# Patient Record
Sex: FEMALE | Race: WHITE | NOT HISPANIC OR LATINO | Employment: FULL TIME | URBAN - METROPOLITAN AREA
[De-identification: names, ages, dates, MRNs, and addresses within clinical notes are randomized per-mention and may not be internally consistent; named-entity substitution may affect disease eponyms.]

---

## 2017-04-05 DIAGNOSIS — F40.298 ISOLATED OR SPECIFIC PHOBIA: ICD-10-CM

## 2017-04-05 DIAGNOSIS — G47.00 INSOMNIA, UNSPECIFIED TYPE: ICD-10-CM

## 2017-04-05 NOTE — TELEPHONE ENCOUNTER
Controlled Substance Refill Request for zolpidem (AMBIEN) 5 MG tablet  Problem List Complete:  No     PROVIDER TO CONSIDER COMPLETION OF PROBLEM LIST AND OVERVIEW/CONTROLLED SUBSTANCE AGREEMENT    Medication Detail      Disp Refills Start End HEENA   zolpidem (AMBIEN) 5 MG tablet 30 tablet 5 8/22/2016  No   Sig: Take 1 tablet (5 mg) by mouth nightly as needed for sleep         Last Office Visit with [facility] primary care provider: 8/22/16    Future Office visit:     Controlled substance agreement on file: No.     Processing:     checked in past 6 months?

## 2017-04-05 NOTE — TELEPHONE ENCOUNTER
Controlled Substance Refill Request for LORazepam (ATIVAN) 0.5 MG tablet  Problem List Complete:  No     PROVIDER TO CONSIDER COMPLETION OF PROBLEM LIST AND OVERVIEW/CONTROLLED SUBSTANCE AGREEMENT    Medication Detail      Disp Refills Start End HEENA   LORazepam (ATIVAN) 0.5 MG tablet 15 tablet 0 8/22/2016  No   Sig: Take 0.5-1 tablets (0.25-0.5 mg) by mouth every 8 hours as needed for anxiety Take 30 minutes prior to departure.  Do not operate a vehicle after taking this medication         [place] [facility] [place]: 8/22/16    Future Office visit:     Controlled substance agreement on file: No.     Processing:     checked in past 6 months?

## 2017-04-06 RX ORDER — ZOLPIDEM TARTRATE 5 MG/1
5 TABLET ORAL
Qty: 30 TABLET | Refills: 5 | Status: SHIPPED | OUTPATIENT
Start: 2017-04-06 | End: 2018-07-24

## 2017-04-06 RX ORDER — LORAZEPAM 0.5 MG/1
0.25-0.5 TABLET ORAL EVERY 8 HOURS PRN
Qty: 15 TABLET | Refills: 0 | Status: SHIPPED | OUTPATIENT
Start: 2017-04-06 | End: 2018-08-16

## 2017-04-06 NOTE — TELEPHONE ENCOUNTER
MA -- I printed and signed the refill. If the patient fills this medication at our pharmacy, please walk the prescription to our pharmacy. If the patient does not fill this prescription at our pharmacy, please fax to the designated pharmacy. Let the patient know when this has been done. Thanks! Christal Keith M.D.

## 2017-06-23 ENCOUNTER — OFFICE VISIT (OUTPATIENT)
Dept: FAMILY MEDICINE | Facility: CLINIC | Age: 52
End: 2017-06-23
Payer: COMMERCIAL

## 2017-06-23 ENCOUNTER — RADIANT APPOINTMENT (OUTPATIENT)
Dept: GENERAL RADIOLOGY | Facility: CLINIC | Age: 52
End: 2017-06-23
Attending: FAMILY MEDICINE
Payer: COMMERCIAL

## 2017-06-23 VITALS
SYSTOLIC BLOOD PRESSURE: 102 MMHG | OXYGEN SATURATION: 99 % | TEMPERATURE: 98.2 F | DIASTOLIC BLOOD PRESSURE: 72 MMHG | WEIGHT: 146 LBS | HEART RATE: 61 BPM | RESPIRATION RATE: 16 BRPM | BODY MASS INDEX: 24.3 KG/M2

## 2017-06-23 DIAGNOSIS — M79.641 PAIN OF RIGHT HAND: ICD-10-CM

## 2017-06-23 DIAGNOSIS — N95.1 MENOPAUSAL SYNDROME (HOT FLASHES): Primary | ICD-10-CM

## 2017-06-23 PROCEDURE — 99214 OFFICE O/P EST MOD 30 MIN: CPT | Performed by: FAMILY MEDICINE

## 2017-06-23 PROCEDURE — 73130 X-RAY EXAM OF HAND: CPT | Mod: RT

## 2017-06-23 RX ORDER — PAROXETINE 7.5 MG/1
1 CAPSULE ORAL AT BEDTIME
Qty: 30 CAPSULE | Refills: 1 | Status: SHIPPED | OUTPATIENT
Start: 2017-06-23 | End: 2018-08-16

## 2017-06-23 NOTE — MR AVS SNAPSHOT
After Visit Summary   6/23/2017    Bridget Coyne    MRN: [id number]           Patient Information     Date Of Birth          1965        Visit Information        Provider Department      6/23/2017 9:20 AM Serenity Duran MD [facility]        Today's Diagnoses     Menopausal syndrome (hot flashes)    -  1    Pain of right hand          Care Instructions    Hot flashes   - PARoxetine Mesylate 7.5 MG CAPS; Take 1 tablet by mouth At Bedtime  - follow up in one month if symptoms are not improving     Hand pain  Course of physical therapy and ibuprofen as needed  If pain not improving, then please call clinic and I'll place a referral for orthopedic clinic.               Follow-ups after your visit        Additional Services     [facility] PT, HAND, AND CHIROPRACTIC REFERRAL       **This order will print in the [facility] Scheduling Office**    Physical Therapy, Hand Therapy and Chiropractic Care are available through:    *[facility]  *[facility]  *[facility]    Call one number to schedule at any of the above locations: [phone].    Your provider has referred you to: Hand Therapy    Indication/Reason for Referral: Hand Pain  Onset of Illness: couple of years, worse over last six months  Therapy Orders: Evaluate and Treat  Special Programs: None  Special Request: None    Darnell Patrick      Additional Comments for the Therapist or Chiropractor: none    Please be aware that coverage of these services is subject to the terms and limitations of your health insurance plan.  Call member services at your health plan with any benefit or coverage questions.      Please bring the following to your appointment:    *Your personal calendar for scheduling future appointments  *Comfortable clothing                  Your next 10 appointments already scheduled     Aug 17, 2017  8:00 AM CDT   PHYSICAL with Christal Keith MD   [facility]  [place] ([facility])    [address] [place] [address] [phone]              Future tests that were ordered for you today     Open Future Orders        Priority Expected Expires Ordered    XR Hand Right G/E 3 Views Routine 6/23/2017 6/23/2018 6/23/2017            Who to contact     If you have questions or need follow up information about today's clinic visit or your schedule please contact [facility] directly at [phone].  Normal or non-critical lab and imaging results will be communicated to you by CicerOOshart, letter or phone within 4 business days after the clinic has received the results. If you do not hear from us within 7 days, please contact the clinic through iWantoo or phone. If you have a critical or abnormal lab result, we will notify you by phone as soon as possible.  Submit refill requests through iWantoo or call your pharmacy and they will forward the refill request to us. Please allow 3 business days for your refill to be completed.          Additional Information About Your Visit        iWantoo Information     iWantoo gives you secure access to your electronic health record. If you see a primary care provider, you can also send messages to your care team and make appointments. If you have questions, please call your primary care clinic.  If you do not have a primary care provider, please call [phone] and they will assist you.        Care EveryWhere ID     This is your Care EveryWhere ID. This could be used by other organizations to access your [facility] medical records  [id number]        Your Vitals Were     Pulse Temperature Respirations Pulse Oximetry BMI (Body Mass Index)       61 98.2  F (36.8  C) (Tympanic) 16 99% 24.3 kg/m2        Blood Pressure from Last 3 Encounters:   06/23/17 102/72   08/22/16 99/66   06/14/16 103/73    Weight from Last 3 Encounters:   06/23/17 146 lb (66.2 kg)   08/22/16 147 lb 12 oz (67 kg)   06/14/16 144 lb  (65.3 kg)              We Performed the Following     [facility] PT, HAND, AND CHIROPRACTIC REFERRAL          Today's Medication Changes          These changes are accurate as of: 6/23/17  9:57 AM.  If you have any questions, ask your nurse or doctor.               Start taking these medicines.        Dose/Directions    PARoxetine Mesylate 7.5 MG Caps   Used for:  Menopausal syndrome (hot flashes)   Started by:  Serenity Duran MD        Dose:  1 tablet   Take 1 tablet by mouth At Bedtime   Quantity:  30 capsule   Refills:  1            Where to get your medicines      These medications were sent to [facility] [id number] [place] [address] AT [facility] & [address] [place] [id number]    Hours:  24-hours Phone:  [phone]     PARoxetine Mesylate 7.5 MG Caps                Primary Care Provider Office Phone # Fax #    Christal Keith [phone]       [facility] [address] [facility] [address]        Equal Access to Services     [facility]: Hadii lu ku hadasho Sokishore, waaxda luqadaha, qaybta kaalmada adeegyaveronica, ravin wade . So [facility] [phone].    ATENCIÓN: Si habla español, tiene a terry disposición servicios gratuitos de asistencia lingüística. Llame al [phone].    We comply with applicable federal civil rights laws and [place] laws. We do not discriminate on the basis of race, color, national origin, age, disability sex, sexual orientation or gender identity.            Thank you!     Thank you for choosing [facility]  for your care. Our goal is always to provide you with excellent care. Hearing back from our patients is one way we can continue to improve our services. Please take a few minutes to complete the written survey that you may receive in the mail after your visit with us. Thank you!             Your Updated Medication List - Protect others around you: Learn  how to safely use, store and throw away your medicines at www.disposemymeds.org.          This list is accurate as of: 6/23/17  9:57 AM.  Always use your most recent med list.                   Brand Name Dispense Instructions for use Diagnosis    acyclovir 5 % cream    ZOVIRAX    1 g    To affected areas    Herpes simplex without mention of complication       ADVIL 200 MG capsule   Generic drug:  ibuprofen     100    1 CAPSULE EVERY 4 TO 6 HOURS AS NEEDED    Back pain       LORazepam 0.5 MG tablet    ATIVAN    15 tablet    Take 0.5-1 tablets (0.25-0.5 mg) by mouth every 8 hours as needed for anxiety Take 30 minutes prior to departure.  Do not operate a vehicle after taking this medication    Isolated or specific phobia       PARoxetine Mesylate 7.5 MG Caps     30 capsule    Take 1 tablet by mouth At Bedtime    Menopausal syndrome (hot flashes)       valACYclovir 1000 mg tablet    VALTREX    4 tablet    Take 2 tablets (2,000 mg) by mouth 2 times daily    Encounter for routine adult health examination without abnormal findings       zolpidem 5 MG tablet    AMBIEN    30 tablet    Take 1 tablet (5 mg) by mouth nightly as needed for sleep    Insomnia, unspecified type

## 2017-06-23 NOTE — PATIENT INSTRUCTIONS
Hot flashes   - PARoxetine Mesylate 7.5 MG CAPS; Take 1 tablet by mouth At Bedtime  - follow up in one month if symptoms are not improving     Hand pain  Course of physical therapy and ibuprofen as needed  If pain not improving, then please call clinic and I'll place a referral for orthopedic clinic.

## 2017-06-23 NOTE — NURSING NOTE
"Chief Complaint   Patient presents with     Musculoskeletal Problem     hand pain       Initial /72 (BP Location: Right arm, Patient Position: Chair, Cuff Size: Adult Regular)  Pulse 61  Temp 98.2  F (36.8  C) (Tympanic)  Resp 16  Wt 146 lb (66.2 kg)  SpO2 99%  BMI 24.3 kg/m2 Estimated body mass index is 24.3 kg/(m^2) as calculated from the following:    Height as of 8/22/16: 5' 5\" (1.651 m).    Weight as of this encounter: 146 lb (66.2 kg).  Medication Reconciliation: complete     Dayne Greer MA        "

## 2017-06-23 NOTE — PROGRESS NOTES
SUBJECTIVE:                                                    rBidget Coyne is a 51 year old female who presents to clinic today for the following health issues:      Musculoskeletal problem/pain      Duration: couple of years, consistent over the last six months     Description  Location: right hand, constant      Intensity:  Mild, aching     Accompanying signs and symptoms: none    History  Previous similar problem: YES  Previous evaluation:  none    Precipitating or alleviating factors:  Trauma or overuse: YES,    Aggravating factors include: overuse    Therapies tried and outcome: ibuprofen which helps    Pt is an .   No numbness/tingling or swelling.  No recent injujry.       Pt is having extreme hot flashes. A/s with nausea and headaches. It is making it uncomfortable at work. She is currently doing deep breathing, black colhash ans soya. She is wondering if she can take an antidepressant for a short term. LMP was around 07-08/2017. No fatigue, hair loss or vaginal dryness. No history of MDD or LATOYA.     Problem list and histories reviewed & adjusted, as indicated.  Additional history: as documented        Reviewed and updated as needed this visit by clinical staff       Reviewed and updated as needed this visit by Provider         ROS:  Constitutional, HEENT, cardiovascular, pulmonary, gi and gu systems are negative, except as otherwise noted.    OBJECTIVE:                                                    /72 (BP Location: Right arm, Patient Position: Chair, Cuff Size: Adult Regular)  Pulse 61  Temp 98.2  F (36.8  C) (Tympanic)  Resp 16  Wt 146 lb (66.2 kg)  SpO2 99%  BMI 24.3 kg/m2  Body mass index is 24.3 kg/(m^2).  GENERAL: healthy, alert and no distress  EYES: Eyes grossly normal to inspection  HENT: nose and mouth without ulcers or lesions  MS: right hand - no gross musculoskeletal defects noted, no edema, + metacarpal tenderness, normal ROM  SKIN: no suspicious  lesions or rashes    Diagnostic Test Results:  none      ASSESSMENT/PLAN:                                                        1. Menopausal syndrome (hot flashes)  - discussed tx with antidepressants vs gabapentin, pt opted for antidepressants, discussed side effects   - PARoxetine Mesylate 7.5 MG CAPS; Take 1 tablet by mouth At Bedtime  Dispense: 30 capsule; Refill: 1  - follow up in one month if symptoms are not improving     2. Pain of right hand  - [facility] PT, HAND, AND CHIROPRACTIC REFERRAL  - XR Hand Right G/E 3 Views; Future  Course of physical therapy and ibuprofen as needed  If pain not improving, then please call clinic and I'll place a referral for orthopedic clinic.     Serenity Duran MD  [facility]

## 2017-07-11 ENCOUNTER — THERAPY VISIT (OUTPATIENT)
Dept: OCCUPATIONAL THERAPY | Facility: CLINIC | Age: 52
End: 2017-07-11
Payer: COMMERCIAL

## 2017-07-11 DIAGNOSIS — M79.641 PAIN OF RIGHT HAND: Primary | ICD-10-CM

## 2017-07-11 PROCEDURE — 97018 PARAFFIN BATH THERAPY: CPT | Mod: GO | Performed by: OCCUPATIONAL THERAPIST

## 2017-07-11 PROCEDURE — 97110 THERAPEUTIC EXERCISES: CPT | Mod: GO | Performed by: OCCUPATIONAL THERAPIST

## 2017-07-11 PROCEDURE — 97165 OT EVAL LOW COMPLEX 30 MIN: CPT | Mod: GO | Performed by: OCCUPATIONAL THERAPIST

## 2017-07-11 PROCEDURE — 97535 SELF CARE MNGMENT TRAINING: CPT | Mod: GO | Performed by: OCCUPATIONAL THERAPIST

## 2017-07-11 NOTE — MR AVS SNAPSHOT
"              After Visit Summary   7/11/2017    Bridget Coyne    MRN: [id number]           Patient Information     Date Of Birth          1965        Visit Information        Provider Department      7/11/2017 4:00 PM Diana Worley OT [facility]        Today's Diagnoses     Pain of right hand    -  1       Follow-ups after your visit        Your next 10 appointments already scheduled     Jul 25, 2017  9:00 AM CDT   [facility] Hand with KAY [facility] ([facility])    [address] [facility]  [address] [facility] [address] [phone]            Aug 17, 2017  8:00 AM CDT   PHYSICAL with Christal Keith MD   [facility] ([facility])    [address] [place] [address] [phone]              Who to contact     If you have questions or need follow up information about today's clinic visit or your schedule please contact [facility] directly at [phone].  Normal or non-critical lab and imaging results will be communicated to you by ClearServehart, letter or phone within 4 business days after the clinic has received the results. If you do not hear from us within 7 days, please contact the clinic through ClearServehart or phone. If you have a critical or abnormal lab result, we will notify you by phone as soon as possible.  Submit refill requests through C-sam or call your pharmacy and they will forward the refill request to us. Please allow 3 business days for your refill to be completed.          Additional Information About Your Visit        MyChart Information     C-sam lets you send messages to your doctor, view your test results, renew your prescriptions, schedule appointments and more. To sign up, go to [email or web address] . Click on \"Log in\" on the left side of the screen, which will take you to the Welcome page. Then click on \"Sign up Now\" on the right side of the page.     You " will be asked to enter the access code listed below, as well as some personal information. Please follow the directions to create your username and password.     Your access code is: [email or web address]  Expires: 10/9/2017  5:21 PM     Your access code will  in 90 days. If you need help or a new code, please call your [facility] clinic or [phone].        Care EveryWhere ID     This is your Care EveryWhere ID. This could be used by other organizations to access your [facility] medical records  [id number]         Blood Pressure from Last 3 Encounters:   17 102/72   16 99/66   16 103/73    Weight from Last 3 Encounters:   17 66.2 kg (146 lb)   16 67 kg (147 lb 12 oz)   16 65.3 kg (144 lb)              We Performed the Following     HC OT EVAL, LOW COMPLEXITY     PARAFFIN BATH THERAPY     SELF CARE MNGMENT TRAINING     THERAPEUTIC EXERCISES        Primary Care Provider Office Phone # Fax #    Christal Keith [phone]       [facility] [address]  [facility] [address]        Equal Access to Services     [facility]: Hadii aad ku hadasho Sokishore, waaxda luqadaha, qaybta kaalmada adeegyada, ravin brockn anh spangler. So [facility] [phone].    ATENCIÓN: Si habla español, tiene a terry disposición servicios gratuitos de asistencia lingüística. [facility] [phone].    We comply with applicable federal civil rights laws and [place] laws. We do not discriminate on the basis of race, color, national origin, age, disability sex, sexual orientation or gender identity.            Thank you!     Thank you for choosing [facility]ER[facility]  for your care. Our goal is always to provide you with excellent care. Hearing back from our patients is one way we can continue to improve our services. Please take a few minutes to complete the written survey that you may receive in the mail after your visit with us. Thank you!             Your Updated  Medication List - Protect others around you: Learn how to safely use, store and throw away your medicines at www.disposemymeds.org.          This list is accurate as of: 7/11/17  5:21 PM.  Always use your most recent med list.                   Brand Name Dispense Instructions for use Diagnosis    acyclovir 5 % cream    ZOVIRAX    1 g    To affected areas    Herpes simplex without mention of complication       ADVIL 200 MG capsule   Generic drug:  ibuprofen     100    1 CAPSULE EVERY 4 TO 6 HOURS AS NEEDED    Back pain       LORazepam 0.5 MG tablet    ATIVAN    15 tablet    Take 0.5-1 tablets (0.25-0.5 mg) by mouth every 8 hours as needed for anxiety Take 30 minutes prior to departure.  Do not operate a vehicle after taking this medication    Isolated or specific phobia       PARoxetine Mesylate 7.5 MG Caps     30 capsule    Take 1 tablet by mouth At Bedtime    Menopausal syndrome (hot flashes)       valACYclovir 1000 mg tablet    VALTREX    4 tablet    Take 2 tablets (2,000 mg) by mouth 2 times daily    Encounter for routine adult health examination without abnormal findings       zolpidem 5 MG tablet    AMBIEN    30 tablet    Take 1 tablet (5 mg) by mouth nightly as needed for sleep    Insomnia, unspecified type

## 2017-07-11 NOTE — PROGRESS NOTES
Hand Therapy Initial Evaluation    Current Date:  7/11/2017    Diagnosis:  Right  hand pain  DOI:  About 2-3 years ago  Referring MD: Serenity Duran MD  CC: Christal Keith MD        Initial Subjective:  Bridget Coyne is a 51 year old  right  hand dominant female.    Patient reports symptoms of pain, stiffness/loss of motion, weakness/loss of strength and edema of the right  hand  which occurred due to unknown cause. Since onset symptoms are Gradually getting worse.  Special tests:  none.  Previous treatment: none.    General health as reported by patient is excellent.  Pertinent medical history includes:menopausal  Medical allergies:none.  Surgical history: none.  Medication history: none.    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 NO specific time of injury. The more she does it is more sore. Even raking, working in the yard, then the hand was sore.   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   Job Tasks: repetitive tasks  Barriers include:none  Prior functional level:  no limitations    Additional Occupational Profile Information (patterns of daily living, interests, values and needs):Pt reports difficulty with . household chores, work , sports/recreation and bowling is difficult, biking is ok.   Opening jars, bottle, etc.   Living situation: lives with their spouse and with children, 18 and 21  Mobility: No difficulty getting around home and community  Transportation: Able to drive own car for transportation  Leisure activities / hobbies: bowling and biking  Other: likes to work out with light weights    Functional Outcome Measure:  See flowsheet        Objective:  Observation: Color/Temperature:     [x]    Normal  []    Abnormal    PAIN 7/11/2017     Location Right  Hand, MCPs and PIPS in all fingers     Description   Aching, Dull, Sharp and Tender     Radiates no     Worse d/n daytime     Frequency activity dependant     Exacerbated by See above list     Relieves heat, NSAIDs and rest     At rest 0-10/10 0/10     On use 0-10/10 3/10     At worst.0-10/10 8/10     Sleep disruption?   no     Progression Gradually getting worse.         ROM:  1st TABLE TOP  AROM(PROM) 2017    Right Left   Index MP /80  /85 /88  /85   PIP /104 /100   DIP /65 /65   Long MP /70  /85 /87  /85   PIP /105 /105   DIP /63 /65   Ring MP /65  /73   /80  /85   PIP /110 /105   DIP /47 /55   Small MP /60  /82 /80  /85   PIP /92 /93   DIP /75 /65     Tender to palpation:   R MF MCP      STRENGTH:   (Measured in pounds)     2017      Trials Right Left Right Left   1  2  3  Av  25  36 60  65  60  62         3 pt Pinch 2017      Trials Right Left Right Left   1  2  3  Av  10  10  10 12  12  12  12       Lateral Pinch 2017      Trials Right Left Right Left   1  2  3  Av  11  13  12 14  12  13  13         Edema:  mild of the  long finger at the MCP joint    Palpation:  []     Normal        [x]   Tender       []  Mild         [x]   Moderate []   Severe   Location: Long Finger MCP joint, dorsal and volar      Sensation:  WNL throughout all nerve distributions; per patient report      Assessment:   Patient presents with symptoms consistent with above diagnosis,  with conservative intervention.     Patient's limitations or Problem List includes:  Pain, Decreased ROM/motion, Weakness and Decreased  of the right hand and long finger which interferes with the patient's ability to perform Self Care Tasks (dressing), Work Tasks, Recreational Activities, Household Chores and Driving  as compared to previous level of function.    Rehab Potential:  Excellent - Return to full activity, no limitations    Patient will benefit from skilled Occupational Therapy to increase ROM, flexibility and  strength and decrease pain and  edema to return to previous activity level and resume normal daily tasks and to reach their rehab potential.    Barriers to Learning:  No barrier    Communication Issues:  Patient appears to be able to clearly communicate and understand verbal and written communication and follow directions correctly.  Clinical Decision Making (Complexity): Low complexity  Chart Review, Brief history including review of medical and/or therapy records relating to the presenting problem and Detailed history review with patient    Assessment of Occupational Performance:  3-5 Performance Deficits  Identified Performance Deficits: dressing, home establishment and management, meal preparation and cleanup, shopping, work, play and leisure activities      Treatment Explanation:  The following has been discussed with the patient:  RX ordered/plan of care  Anticipated outcomes  Possible risks and side effects    Treatment Plan:   Frequency:  2 X a month, once daily  Duration:  for 2 months     Modalities:  Paraffin  Therapeutic Exercise:  Tendon Gliding and Isometrics  Self Care:  Ergonomic Considerations    Discharge Plan:  Achieve all LTG.  Independent in home treatment program.  Reach maximal therapeutic benefit.    Home Exercise Program:  Tendon gliding  Table top fist  Home paraffin bath  Pursue adaptive equipment for joint protection    Next Visit:  Progress to isometrics

## 2017-07-25 ENCOUNTER — THERAPY VISIT (OUTPATIENT)
Dept: OCCUPATIONAL THERAPY | Facility: CLINIC | Age: 52
End: 2017-07-25
Payer: COMMERCIAL

## 2017-07-25 DIAGNOSIS — M79.641 PAIN OF RIGHT HAND: ICD-10-CM

## 2017-07-25 PROCEDURE — 97018 PARAFFIN BATH THERAPY: CPT | Mod: GO | Performed by: OCCUPATIONAL THERAPIST

## 2017-07-25 PROCEDURE — 97140 MANUAL THERAPY 1/> REGIONS: CPT | Mod: GO | Performed by: OCCUPATIONAL THERAPIST

## 2017-07-25 PROCEDURE — 97530 THERAPEUTIC ACTIVITIES: CPT | Mod: GO | Performed by: OCCUPATIONAL THERAPIST

## 2017-07-25 NOTE — MR AVS SNAPSHOT
"              After Visit Summary   7/25/2017    Bridget Coyne    MRN: [id number]           Patient Information     Date Of Birth          1965        Visit Information        Provider Department      7/25/2017 9:00 AM Diana Worley OT [facility]        Today's Diagnoses     Pain of right hand           Follow-ups after your visit        Your next 10 appointments already scheduled     Aug 14, 2017 12:30 PM CDT   JOÃO Hand with KAY [facility] ([facility])    [address] [facility] [address] [phone]            Aug 17, 2017  8:00 AM CDT   PHYSICAL with Christal Keith MD   [facility] ([facility])    [address] [place] [address] [phone]              Who to contact     If you have questions or need follow up information about today's clinic visit or your schedule please contact [facility] directly at [phone].  Normal or non-critical lab and imaging results will be communicated to you by [employer]Chart, letter or phone within 4 business days after the clinic has received the results. If you do not hear from us within 7 days, please contact the clinic through Bant[employer]Chart or phone. If you have a critical or abnormal lab result, we will notify you by phone as soon as possible.  Submit refill requests through [employer] or call your pharmacy and they will forward the refill request to us. Please allow 3 business days for your refill to be completed.          Additional Information About Your Visit        [employer]Char1Rebel Information     [employer] lets you send messages to your doctor, view your test results, renew your prescriptions, schedule appointments and more. To sign up, go to [email or web address] . Click on \"Log in\" on the left side of the screen, which will take you to the Welcome page. Then click on \"Sign up Now\" on the right side of the page.     You will " be asked to enter the access code listed below, as well as some personal information. Please follow the directions to create your username and password.     Your access code is: [email or web address]  Expires: 10/9/2017  5:21 PM     Your access code will  in 90 days. If you need help or a new code, please call your [place] clinic or [phone].        Care EveryWhere ID     This is your Care EveryWhere ID. This could be used by other organizations to access your [place] medical records  [id number]         Blood Pressure from Last 3 Encounters:   17 102/72   16 99/66   16 103/73    Weight from Last 3 Encounters:   17 66.2 kg (146 lb)   16 67 kg (147 lb 12 oz)   16 65.3 kg (144 lb)              We Performed the Following     MANUAL THER TECH,1+REGIONS,EA 15 MIN     PARAFFIN BATH THERAPY     THERAPEUTIC ACTIVITIES        Primary Care Provider Office Phone # Fax #    Christal Keith [phone]       [facility] [address]  [facility] [address]        Equal Access to Services     [place] : Hadii aad ku hadasho Soomaali, waaxda luqadaha, qaybta kaalmada adeegyada, waxay luisin hayaan adecharles palafox lamilagros ah. So [facility] [phone].    ATENCIÓN: Si habla español, tiene a terry disposición servicios gratuitos de asistencia lingüística. Llame al [phone].    We comply with applicable federal civil rights laws and [place] laws. We do not discriminate on the basis of race, color, national origin, age, disability sex, sexual orientation or gender identity.            Thank you!     Thank you for choosing [facility]  for your care. Our goal is always to provide you with excellent care. Hearing back from our patients is one way we can continue to improve our services. Please take a few minutes to complete the written survey that you may receive in the mail after your visit with us. Thank you!             Your Updated Medication List - Protect  others around you: Learn how to safely use, store and throw away your medicines at www.disposemymeds.org.          This list is accurate as of: 7/25/17  9:55 AM.  Always use your most recent med list.                   Brand Name Dispense Instructions for use Diagnosis    acyclovir 5 % cream    ZOVIRAX    1 g    To affected areas    Herpes simplex without mention of complication       ADVIL 200 MG capsule   Generic drug:  ibuprofen     100    1 CAPSULE EVERY 4 TO 6 HOURS AS NEEDED    Back pain       LORazepam 0.5 MG tablet    ATIVAN    15 tablet    Take 0.5-1 tablets (0.25-0.5 mg) by mouth every 8 hours as needed for anxiety Take 30 minutes prior to departure.  Do not operate a vehicle after taking this medication    Isolated or specific phobia       PARoxetine Mesylate 7.5 MG Caps     30 capsule    Take 1 tablet by mouth At Bedtime    Menopausal syndrome (hot flashes)       valACYclovir 1000 mg tablet    VALTREX    4 tablet    Take 2 tablets (2,000 mg) by mouth 2 times daily    Encounter for routine adult health examination without abnormal findings       zolpidem 5 MG tablet    AMBIEN    30 tablet    Take 1 tablet (5 mg) by mouth nightly as needed for sleep    Insomnia, unspecified type

## 2017-07-25 NOTE — PROGRESS NOTES
SOAP note objective information for 7/25/2017.  Please refer to the daily flowsheet for treatment today, total treatment time and time spent performing 1:1 timed codes.        Diagnosis:  Right  hand pain  DOI:  About 2-3 years ago  Referring MD: Serenity Duran MD  CC: Christal Keith MD    Initial Subjective:  Bridget Coyne is a 51 year old  right  hand dominant female.    Patient reports symptoms of pain, stiffness/loss of motion, weakness/loss of strength and edema of the right  hand  which occurred due to unknown cause. Since onset symptoms are Gradually getting worse.  Special tests:  none.  Previous treatment: none.    General health as reported by patient is excellent.  Pertinent medical history includes:menopausal  Medical allergies:none.  Surgical history: none.  Medication history: none.    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 NO specific time of injury. The more she does it is more sore. Even raking, working in the yard, then the hand was sore.   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   Job Tasks: repetitive tasks  Barriers include:none  Prior functional level:  no limitations    Additional Occupational Profile Information (patterns of daily living, interests, values and needs):Pt reports difficulty with . household chores, work , sports/recreation and bowling is difficult, biking is ok.   Opening jars, bottle, etc.   Living situation: lives with their spouse and with children, 18 and 21  Mobility: No difficulty getting around home and community  Transportation: Able to drive own car for transportation  Leisure activities / hobbies: bowling and biking  Other: likes to work out with light weights    Functional Outcome Measure:  See flowsheet    Exercises: strike class: Strike bar, and then move the bar side  to side, punching.     Objective:  Observation: Color/Temperature:     [x]    Normal  []    Abnormal    PAIN 2017     Location Right  Hand, MCPs and PIPS in all fingers     Description  Aching, Dull, Sharp and Tender     Radiates no     Worse d/n daytime     Frequency activity dependant     Exacerbated by See above list     Relieves heat, NSAIDs and rest     At rest 0-10/10 0/10     On use 0-10/10 3/10     At worst.0-10/10 8/10     Sleep disruption?   no     Progression Gradually getting worse.         ROM:  1st TABLE TOP  AROM(PROM) 2017    Right Left Right before heat   Index MP /80  /85 (after heat) /88  /85    PIP /104 /100    DIP /65 /65    Long MP /70  /85 (after heat) /87  /85 /80   PIP /105 /105 /107   DIP /63 /65 /75   Ring MP /65  /73 (after heat)   /80  /85    PIP /110 /105    DIP /47 /55    Small MP /60  /82 (after heat) /80  /85    PIP /92 /93    DIP /75 /65      Tender to palpation:   R MF MCP      STRENGTH:   (Measured in pounds)     2017    Trials Right Left Right Left   1  2  3  Av  25  36 60  65  60  62 35  3rd run        3 pt Pinch 2017      Trials Right Left Right Left   1  2  3  Av  10  10  10 12  12  12  12       Lateral Pinch 2017      Trials Right Left Right Left   1  2  3  Av  11  13  12 14  12  13  13         Edema:  mild of the  long finger at the MCP joint    Palpation:  []     Normal        [x]   Tender       []  Mild         [x]   Moderate []   Severe   Location: Long Finger MCP joint, dorsal and volar      Sensation:  WNL throughout all nerve distributions; per patient report      Assessment:   Patient presents with symptoms consistent with above diagnosis,  with conservative intervention.     Patient's limitations or Problem List includes:  Pain, Decreased ROM/motion, Weakness and Decreased  of the right hand and long finger which interferes with the patient's ability to perform Self Care Tasks (dressing), Work  Tasks, Recreational Activities, Household Chores and Driving  as compared to previous level of function.    Rehab Potential:  Excellent - Return to full activity, no limitations    Patient will benefit from skilled Occupational Therapy to increase ROM, flexibility and  strength and decrease pain and edema to return to previous activity level and resume normal daily tasks and to reach their rehab potential.    Barriers to Learning:  No barrier    Communication Issues:  Patient appears to be able to clearly communicate and understand verbal and written communication and follow directions correctly.  Clinical Decision Making (Complexity): Low complexity  Chart Review, Brief history including review of medical and/or therapy records relating to the presenting problem and Detailed history review with patient    Assessment of Occupational Performance:  3-5 Performance Deficits  Identified Performance Deficits: dressing, home establishment and management, meal preparation and cleanup, shopping, work, play and leisure activities      Treatment Explanation:  The following has been discussed with the patient:  RX ordered/plan of care  Anticipated outcomes  Possible risks and side effects    Treatment Plan:   Frequency:  2 X a month, once daily  Duration:  for 2 months     Modalities:  Paraffin  Therapeutic Exercise:  Tendon Gliding and Isometrics  Self Care:  Ergonomic Considerations    Discharge Plan:  Achieve all LTG.  Independent in home treatment program.  Reach maximal therapeutic benefit.    Home Exercise Program:  Tendon gliding  Table top fist  Home paraffin bath  Pursue adaptive equipment for joint protection    Next Visit:  Progress to isometrics

## 2017-08-14 ENCOUNTER — THERAPY VISIT (OUTPATIENT)
Dept: OCCUPATIONAL THERAPY | Facility: CLINIC | Age: 52
End: 2017-08-14
Payer: COMMERCIAL

## 2017-08-14 DIAGNOSIS — M79.641 PAIN OF RIGHT HAND: ICD-10-CM

## 2017-08-14 PROCEDURE — 97110 THERAPEUTIC EXERCISES: CPT | Mod: GO | Performed by: OCCUPATIONAL THERAPIST

## 2017-08-14 PROCEDURE — 97535 SELF CARE MNGMENT TRAINING: CPT | Mod: GO | Performed by: OCCUPATIONAL THERAPIST

## 2017-08-14 NOTE — PROGRESS NOTES
Hand Therapy Progress/Discharge Note     8/14/2017  Initial Visit: 7/11/17  Total Visits: 3      Diagnosis:  Right  hand pain  DOI:  About 2-3 years ago  Referring MD: Serenity Duran MD  CC: Christal Keith MD      Initial Subjective:  Bridget Coyne is a 51 year old  right  hand dominant female.  Patient reports symptoms of pain, stiffness/loss of motion, weakness/loss of strength and edema of the right  hand  which occurred due to unknown cause.  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 NO specific time of injury. The more she does it is more sore. Even raking, working in the yard, then the hand was sore.   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   S:   Subjective: patient notes she feels like she has more knowledge to know what to do about her hand pain. She notes overall less pain   Functional changes noted by patient:   Improvement in Household Chores  Response to previous treatment: good  Patient has noted adverse reaction to:   None      Functional Outcome Measure:  See flowsheet    Objective:  Observation: Color/Temperature:     [x]    Normal  []    Abnormal    PAIN 7/11/2017 8/14/17    Location Right  Hand, MCPs and PIPS in all fingers     Description  Aching, Dull, Sharp and Tender     Radiates no     Worse d/n daytime     Frequency activity dependant     Exacerbated by See above list     Relieves heat, NSAIDs and rest     At rest 0-10/10 0/10 0/10    On use 0-10/10 3/10 3/10    At worst.0-10/10 8/10 6/10 less often    Sleep disruption?   no     Progression Gradually getting worse.         ROM:  1st TABLE TOP  AROM(PROM) 7/11/2017 7/11/2017 8/14/17    Right Left right   Index MP /80  /85 (after heat) /88  /85 /85   PIP /104 /100 /105   DIP /65 /65 /70   DURON:  249  260   Long MP /70  /85 (after heat) /87  /85 /90    PIP /105 /105 /110   DIP /63 /65 /75   DURON:  168  275   Ring MP /65  /73 (after heat)   /80  /85 /83   PIP /110 /105 /105   DIP /47 /55 /65   DURON:  122  253   Small MP /60  /82 (after heat) /80  /85 /90   PIP /92 /93 /105   DIP /75 /65 /80   DURON:  227  275     STRENGTH:   (Measured in pounds)     2017   Trials Right Left Right right   1  2  3  Av  25  36 60  65  60  62 35  3rd run 45  3rd run       3 pt Pinch 2017    Trials Right Left   1  2  3  Av  10  10  10 12  12  12  12     Lateral Pinch 2017    Trials Right Left   1  2  3  Av  11  13  12 14  12  13  13       Edema:  Noted to be less obvious edema of the  long finger at the MCP joint    Palpation:  []     Normal        [x]   Tender       [x]  Mild         []   Moderate []   Severe   Location: Long Finger MCP joint, dorsal and volar      Sensation:  WNL throughout all nerve distributions; per patient report      Assessment:  Response to therapy has been improvement to:  ROM of Fingers: MP joint - Flex, PIP joint - Flex, DIP joint - Flex  Strength:    Pain:  frequency is less, intensity of pain is decreased, duration of pain is decreased and less tender over affected area    Overall Assessment:  Patient's symptoms are resolving.  Patient is becoming more independent in home exercise program  STG/LTG:  STGoals have been reviewed and progress or achievement has occurred;  see goal sheet for details and updates.  I have re-evaluated this patient and find that the nature, scope, duration and intensity of the therapy is appropriate for the medical condition of the patient.          Treatment Plan:   Discharge to home program.    Modalities:  Paraffin  Therapeutic Exercise:  Tendon Gliding and Isometrics  Self Care:  Ergonomic Considerations    Discharge Plan:  Achieve all LTG.  Independent in home treatment program.  Reach maximal therapeutic benefit.    Home Exercise Program:  Tendon gliding  Table top  fist  Home paraffin bath  Pursue adaptive equipment for joint protection  8/14/2017  Rice gopal for heat at school  Continue exercises  Build up handles of rake, or broom to increase  diameter.  Continue with Isometric ball squeezes avoiding pain with gripping, but build up strength

## 2017-08-14 NOTE — MR AVS SNAPSHOT
After Visit Summary   8/14/2017    Bridget Coyne    MRN: [id number]           Patient Information     Date Of Birth          1965        Visit Information        Provider Department      8/14/2017 12:30 PM Diana Worley, KAY [facility]        Today's Diagnoses     Pain of right hand           Follow-ups after your visit        Your next 10 appointments already scheduled     Aug 17, 2017  8:00 AM CDT   PHYSICAL with Christal Keith MD   [facility] ([facility])    [address] [place] [address] [phone]              Who to contact     If you have questions or need follow up information about today's clinic visit or your schedule please contact  [facility] directly at [phone].  Normal or non-critical lab and imaging results will be communicated to you by MyChart, letter or phone within 4 business days after the clinic has received the results. If you do not hear from us within 7 days, please contact the clinic through MyChart or phone. If you have a critical or abnormal lab result, we will notify you by phone as soon as possible.  Submit refill requests through [employer] or call your pharmacy and they will forward the refill request to us. Please allow 3 business days for your refill to be completed.          Additional Information About Your Visit        MyChart Information     [employer] gives you secure access to your electronic health record. If you see a primary care provider, you can also send messages to your care team and make appointments. If you have questions, please call your primary care clinic.  If you do not have a primary care provider, please call [phone] and they will assist you.        Care EveryWhere ID     This is your Care EveryWhere ID. This could be used by other organizations to access your [place] medical records  [id number]         Blood Pressure from Last 3 Encounters:   06/23/17  102/72   08/22/16 99/66   06/14/16 103/73    Weight from Last 3 Encounters:   06/23/17 66.2 kg (146 lb)   08/22/16 67 kg (147 lb 12 oz)   06/14/16 65.3 kg (144 lb)              We Performed the Following     JOÃO PROGRESS NOTES REPORT     SELF CARE MNGMENT TRAINING     THERAPEUTIC EXERCISES        Primary Care Provider Office Phone # Fax #    Christal Keith [phone]       [address]  [facility] [address]        Equal Access to Services     [facility]: Hadii aad ku hadasho Soomaali, waaxda luqadaha, qaybta kaalmada adeegyada, waxmarissa price haydora wade . So [facility] [phone].    ATENCIÓN: Si habla español, tiene a terry disposición servicios gratuitos de asistencia lingüística. LlEast [facility] [phone].    We comply with applicable federal civil rights laws and [place] laws. We do not discriminate on the basis of race, color, national origin, age, disability sex, sexual orientation or gender identity.            Thank you!     Thank you for choosing [facility]  for your care. Our goal is always to provide you with excellent care. Hearing back from our patients is one way we can continue to improve our services. Please take a few minutes to complete the written survey that you may receive in the mail after your visit with us. Thank you!             Your Updated Medication List - Protect others around you: Learn how to safely use, store and throw away your medicines at www.disposemymeds.org.          This list is accurate as of: 8/14/17  1:18 PM.  Always use your most recent med list.                   Brand Name Dispense Instructions for use Diagnosis    acyclovir 5 % cream    ZOVIRAX    1 g    To affected areas    Herpes simplex without mention of complication       ADVIL 200 MG capsule   Generic drug:  ibuprofen     100    1 CAPSULE EVERY 4 TO 6 HOURS AS NEEDED    Back pain       LORazepam 0.5 MG tablet    ATIVAN    15 tablet    Take 0.5-1 tablets (0.25-0.5 mg) by  mouth every 8 hours as needed for anxiety Take 30 minutes prior to departure.  Do not operate a vehicle after taking this medication    Isolated or specific phobia       PARoxetine Mesylate 7.5 MG Caps     30 capsule    Take 1 tablet by mouth At Bedtime    Menopausal syndrome (hot flashes)       valACYclovir 1000 mg tablet    VALTREX    4 tablet    Take 2 tablets (2,000 mg) by mouth 2 times daily    Encounter for routine adult health examination without abnormal findings       zolpidem 5 MG tablet    AMBIEN    30 tablet    Take 1 tablet (5 mg) by mouth nightly as needed for sleep    Insomnia, unspecified type

## 2017-08-17 ENCOUNTER — RESULT FOLLOW UP (OUTPATIENT)
Dept: FAMILY MEDICINE | Facility: CLINIC | Age: 52
End: 2017-08-17

## 2017-08-17 ENCOUNTER — OFFICE VISIT (OUTPATIENT)
Dept: FAMILY MEDICINE | Facility: CLINIC | Age: 52
End: 2017-08-17
Payer: COMMERCIAL

## 2017-08-17 VITALS
WEIGHT: 149.5 LBS | OXYGEN SATURATION: 98 % | DIASTOLIC BLOOD PRESSURE: 76 MMHG | BODY MASS INDEX: 24.91 KG/M2 | HEART RATE: 68 BPM | TEMPERATURE: 98 F | SYSTOLIC BLOOD PRESSURE: 108 MMHG | HEIGHT: 65 IN | RESPIRATION RATE: 19 BRPM

## 2017-08-17 DIAGNOSIS — Z13.1 SCREENING FOR DIABETES MELLITUS: ICD-10-CM

## 2017-08-17 DIAGNOSIS — Z00.00 ROUTINE GENERAL MEDICAL EXAMINATION AT A HEALTH CARE FACILITY: Primary | ICD-10-CM

## 2017-08-17 DIAGNOSIS — Z11.3 ROUTINE SCREENING FOR STI (SEXUALLY TRANSMITTED INFECTION): ICD-10-CM

## 2017-08-17 DIAGNOSIS — Z12.4 CERVICAL CANCER SCREENING: ICD-10-CM

## 2017-08-17 DIAGNOSIS — Z13.220 LIPID SCREENING: ICD-10-CM

## 2017-08-17 DIAGNOSIS — Z00.00 ENCOUNTER FOR ROUTINE ADULT HEALTH EXAMINATION WITHOUT ABNORMAL FINDINGS: ICD-10-CM

## 2017-08-17 DIAGNOSIS — N95.1 MENOPAUSAL SYNDROME (HOT FLASHES): ICD-10-CM

## 2017-08-17 LAB
CHOLEST SERPL-MCNC: 214 MG/DL
GLUCOSE SERPL-MCNC: 84 MG/DL (ref 70–99)
HDLC SERPL-MCNC: 118 MG/DL
LDLC SERPL CALC-MCNC: 80 MG/DL
NONHDLC SERPL-MCNC: 96 MG/DL
T PALLIDUM IGG+IGM SER QL: NEGATIVE
TRIGL SERPL-MCNC: 80 MG/DL

## 2017-08-17 PROCEDURE — 87491 CHLMYD TRACH DNA AMP PROBE: CPT | Performed by: FAMILY MEDICINE

## 2017-08-17 PROCEDURE — 87389 HIV-1 AG W/HIV-1&-2 AB AG IA: CPT | Performed by: FAMILY MEDICINE

## 2017-08-17 PROCEDURE — 82947 ASSAY GLUCOSE BLOOD QUANT: CPT | Performed by: FAMILY MEDICINE

## 2017-08-17 PROCEDURE — 87624 HPV HI-RISK TYP POOLED RSLT: CPT | Performed by: FAMILY MEDICINE

## 2017-08-17 PROCEDURE — 36415 COLL VENOUS BLD VENIPUNCTURE: CPT | Performed by: FAMILY MEDICINE

## 2017-08-17 PROCEDURE — G0145 SCR C/V CYTO,THINLAYER,RESCR: HCPCS | Performed by: FAMILY MEDICINE

## 2017-08-17 PROCEDURE — 99396 PREV VISIT EST AGE 40-64: CPT | Performed by: FAMILY MEDICINE

## 2017-08-17 PROCEDURE — 80061 LIPID PANEL: CPT | Performed by: FAMILY MEDICINE

## 2017-08-17 PROCEDURE — 86780 TREPONEMA PALLIDUM: CPT | Performed by: FAMILY MEDICINE

## 2017-08-17 PROCEDURE — 87591 N.GONORRHOEAE DNA AMP PROB: CPT | Performed by: FAMILY MEDICINE

## 2017-08-17 RX ORDER — PAROXETINE 7.5 MG/1
1 CAPSULE ORAL AT BEDTIME
Qty: 30 CAPSULE | Refills: 1 | Status: CANCELLED | OUTPATIENT
Start: 2017-08-17

## 2017-08-17 RX ORDER — VALACYCLOVIR HYDROCHLORIDE 1 G/1
2000 TABLET, FILM COATED ORAL 2 TIMES DAILY
Qty: 4 TABLET | Refills: 5 | Status: SHIPPED | OUTPATIENT
Start: 2017-08-17 | End: 2021-11-04

## 2017-08-17 NOTE — MR AVS SNAPSHOT
After Visit Summary   8/17/2017    Bridget Coyne    MRN: [id number]           Patient Information     Date Of Birth          1965        Visit Information        Provider Department      8/17/2017 8:00 AM Christal Keith MD [facility]        Today's Diagnoses     Routine general medical examination at a health care facility    -  1    Menopausal syndrome (hot flashes)        Encounter for routine adult health examination without abnormal findings        Cervical cancer screening        Lipid screening        Screening for diabetes mellitus          Care Instructions      Preventive Health Recommendations  Female Ages 50 - 64    Yearly exam: See your health care provider every year in order to  o Review health changes.   o Discuss preventive care.    o Review your medicines if your doctor has prescribed any.      Get a Pap test every three years (unless you have an abnormal result and your provider advises testing more often).    If you get Pap tests with HPV test, you only need to test every 5 years, unless you have an abnormal result.     You do not need a Pap test if your uterus was removed (hysterectomy) and you have not had cancer.    You should be tested each year for STDs (sexually transmitted diseases) if you're at risk.     Have a mammogram every 1 to 2 years.    Have a colonoscopy at age 50, or have a yearly FIT test (stool test). These exams screen for colon cancer.      Have a cholesterol test every 5 years, or more often if advised.    Have a diabetes test (fasting glucose) every three years. If you are at risk for diabetes, you should have this test more often.     If you are at risk for osteoporosis (brittle bone disease), think about having a bone density scan (DEXA).    Shots: Get a flu shot each year. Get a tetanus shot every 10 years.    Nutrition:     Eat at least 5 servings of fruits and vegetables each day.    Eat whole-grain bread, whole-wheat pasta and  "brown rice instead of white grains and rice.    Talk to your provider about Calcium and Vitamin D.     Lifestyle    Exercise at least 150 minutes a week (30 minutes a day, 5 days a week). This will help you control your weight and prevent disease.    Limit alcohol to one drink per day.    No smoking.     Wear sunscreen to prevent skin cancer.     See your dentist every six months for an exam and cleaning.    See your eye doctor every 1 to 2 years.            Follow-ups after your visit        Who to contact     If you have questions or need follow up information about today's clinic visit or your schedule please contact [facility] directly at [phone].  Normal or non-critical lab and imaging results will be communicated to you by MyChart, letter or phone within 4 business days after the clinic has received the results. If you do not hear from us within 7 days, please contact the clinic through [employer] or phone. If you have a critical or abnormal lab result, we will notify you by phone as soon as possible.  Submit refill requests through [employer] or call your pharmacy and they will forward the refill request to us. Please allow 3 business days for your refill to be completed.          Additional Information About Your Visit        Dinamundo Information     [employer] gives you secure access to your electronic health record. If you see a primary care provider, you can also send messages to your care team and make appointments. If you have questions, please call your primary care clinic.  If you do not have a primary care provider, please call [phone] and they will assist you.        Care EveryWhere ID     This is your Care EveryWhere ID. This could be used by other organizations to access your [facility] medical records  [id number]        Your Vitals Were     Pulse Temperature Respirations Height Last Period Pulse Oximetry    68 98  F (36.7  C) (Oral) 19 5' 4.5\" (1.638 m) 10/10/2016 98%    BMI (Body " Mass Index)                   25.27 kg/m2            Blood Pressure from Last 3 Encounters:   08/17/17 108/76   06/23/17 102/72   08/22/16 99/66    Weight from Last 3 Encounters:   08/17/17 149 lb 8 oz (67.8 kg)   06/23/17 146 lb (66.2 kg)   08/22/16 147 lb 12 oz (67 kg)              We Performed the Following     Glucose     HPV High Risk Types DNA Cervical     Lipid panel reflex to direct LDL     Pap imaged thin layer screen with HPV - recommended age 30 - 65          Where to get your medicines      These medications were sent to [facility] [address] AT [facility] & [address] [place] [id number]    Hours:  24-hours Phone:  [phone]     valACYclovir 1000 mg tablet          Primary Care Provider Office Phone # Fax #    Christal Keith [phone] [address] [facility] [address]        Equal Access to Services     OTTO WEST : Hadii lu ku hadasho Soomaali, waaxda luqadaha, qaybta kaalmada adeegyada, ravin price haydora wade . So [facility] [phone].    ATENCIÓN: Si habla español, tiene a terry disposición servicios gratuitos de asistencia lingüística. Llame al [phone].    We comply with applicable federal civil rights laws and [place] laws. We do not discriminate on the basis of race, color, national origin, age, disability sex, sexual orientation or gender identity.            Thank you!     Thank you for choosing [facility]  for your care. Our goal is always to provide you with excellent care. Hearing back from our patients is one way we can continue to improve our services. Please take a few minutes to complete the written survey that you may receive in the mail after your visit with us. Thank you!             Your Updated Medication List - Protect others around you: Learn how to safely use, store and throw away your medicines at www.disposemymeds.org.          This  list is accurate as of: 8/17/17  8:29 AM.  Always use your most recent med list.                   Brand Name Dispense Instructions for use Diagnosis    acyclovir 5 % cream    ZOVIRAX    1 g    To affected areas    Herpes simplex without mention of complication       ADVIL 200 MG capsule   Generic drug:  ibuprofen     100    1 CAPSULE EVERY 4 TO 6 HOURS AS NEEDED    Back pain       LORazepam 0.5 MG tablet    ATIVAN    15 tablet    Take 0.5-1 tablets (0.25-0.5 mg) by mouth every 8 hours as needed for anxiety Take 30 minutes prior to departure.  Do not operate a vehicle after taking this medication    Isolated or specific phobia       PARoxetine Mesylate 7.5 MG Caps     30 capsule    Take 1 tablet by mouth At Bedtime    Menopausal syndrome (hot flashes)       valACYclovir 1000 mg tablet    VALTREX    4 tablet    Take 2 tablets (2,000 mg) by mouth 2 times daily    Encounter for routine adult health examination without abnormal findings       zolpidem 5 MG tablet    AMBIEN    30 tablet    Take 1 tablet (5 mg) by mouth nightly as needed for sleep    Insomnia, unspecified type

## 2017-08-17 NOTE — NURSING NOTE
"Chief Complaint   Patient presents with     Physical       Initial /76 (BP Location: Left arm, Patient Position: Sitting, Cuff Size: Adult Regular)  Pulse 68  Temp 98  F (36.7  C) (Oral)  Resp 19  Ht 5' 4.5\" (1.638 m)  Wt 149 lb 8 oz (67.8 kg)  LMP 10/10/2016  SpO2 98%  BMI 25.27 kg/m2 Estimated body mass index is 25.27 kg/(m^2) as calculated from the following:    Height as of this encounter: 5' 4.5\" (1.638 m).    Weight as of this encounter: 149 lb 8 oz (67.8 kg).  Medication Reconciliation: complete     Monie Colón CMA  "

## 2017-08-17 NOTE — LETTER
August 29, 2017      Bridget Coyne  [address] [place] [id number]    Dear ,      This letter is in regards to the PAP smear and HPV (Human Papillomavirus) test you had done recently. Your PAP test result is normal, but your HPV (Human Papillomavirus) test was positive.     About 80 percent of women have been exposed to HPV virus throughout their lifetime. There is no medication for the treatment of HPV. Typically your own immune system gets rid of the virus before it does harm. HPV is spread by direct skin-to-skin contact, including sexual intercourse, oral sex, anal sex, or any other contact involving the genital area (example: hand to genital contact). It is not possible to become infected with HPV by touching an object, such as a toilet seat. Most people who are infected with HPV have no signs or symptoms.    Things that you can do to boost your immune system and help your body get rid of HPV: get plenty of rest, eat a well-balanced diet of healthy foods, and stop smoking.     Please return in 1 year to repeat your pap smear and HPV test.     If you have additional questions regarding this result, please call [phone].    Sincerely,      Christal Keith MD/[facility]

## 2017-08-17 NOTE — PROGRESS NOTES
SUBJECTIVE:   CC: Bridget Coyne is an 51 year old woman who presents for preventive health visit.     Physical   Annual:     Getting at least 3 servings of Calcium per day::  Yes    Bi-annual eye exam::  Yes    Dental care twice a year::  Yes    Sleep apnea or symptoms of sleep apnea::  None    Diet::  Regular (no restrictions)    Frequency of exercise::  6-7 days/week    Duration of exercise::  Greater than 60 minutes    Taking medications regularly::  Yes    Medication side effects::  None    Additional concerns today::  No    Answers for HPI/ROS submitted by the patient on 8/17/2017   PHQ-2 Score: 0    Menopausal syndrome: Bridget relates her hot flashes associated with menopause have ceased while she has not been working over the summer. She was prescribed paroxetine 7.5 mg for alleviation, but has not taken it since she has not had any hot flashes. She will start paroxetine if hot flashes recur when she starts working again.     Right hand pain: Hand pain has improved with hand therapy. She was provided with at home exercise, and occupational therapist recommended benjamin sock and paraffin baths.     Insomnia: Bridget reports she uses Ambien infrequently as she has read about ties to alzheimer's. She takes tylenol PM if needed.     Fear of flying: She uses lorazepam for travel purposes only.     Health maintenance: Mammogram was negative on 7/11/17; Colonoscopy: one polyps was resected and retrieved, repeat in 5 years. Pap smear due today.     Today's PHQ-2 Score:   PHQ-2 ( 1999 Pfizer) 8/17/2017   Q1: Little interest or pleasure in doing things 0   Q2: Feeling down, depressed or hopeless 0   PHQ-2 Score 0   Q1: Little interest or pleasure in doing things Not at all   Q2: Feeling down, depressed or hopeless Not at all   PHQ-2 Score 0       Abuse: Current or Past(Physical, Sexual or Emotional)- No  Do you feel safe in your environment - Yes    Social History   Substance Use Topics     Smoking status: Never Smoker      Smokeless tobacco: Never Used     Alcohol use Yes      Comment: 6 drinks per week     The patient does not drink >3 drinks per day nor >7 drinks per week.    Reviewed orders with patient.  Reviewed health maintenance and updated orders accordingly - Yes  BP Readings from Last 3 Encounters:   08/17/17 108/76   06/23/17 102/72   08/22/16 99/66    Wt Readings from Last 3 Encounters:   08/17/17 67.8 kg (149 lb 8 oz)   06/23/17 66.2 kg (146 lb)   08/22/16 67 kg (147 lb 12 oz)                  Patient Active Problem List   Diagnosis     Contraceptive management     Herpes simplex virus (HSV) infection     CARDIOVASCULAR SCREENING; LDL GOAL LESS THAN 160     Insomnia     Past Surgical History:   Procedure Laterality Date     HC TOOTH EXTRACTION W/FORCEP       TONSILLECTOMY         Social History   Substance Use Topics     Smoking status: Never Smoker     Smokeless tobacco: Never Used     Alcohol use Yes      Comment: 6 drinks per week     Family History   Problem Relation Age of Onset     Breast Cancer Paternal Grandmother      and stomach cancer     Cancer - colorectal Maternal Grandmother      CANCER Maternal Grandmother      colon cancer     HEART DISEASE Maternal Grandmother      heart attack     CANCER Maternal Aunt      brain cancer     Neurologic Disorder Father      parkinson's disease     Liver Disease Father 70     Hypertension Mother      Lipids Mother      DIABETES Brother      on oral agent     Breast Cancer Other      cousin     CANCER Paternal Uncle      lung         Current Outpatient Prescriptions   Medication Sig Dispense Refill     valACYclovir (VALTREX) 1000 mg tablet Take 2 tablets (2,000 mg) by mouth 2 times daily 4 tablet 5     PARoxetine Mesylate 7.5 MG CAPS Take 1 tablet by mouth At Bedtime 30 capsule 1     ADVIL 200 MG OR CAPS 1 CAPSULE EVERY 4 TO 6 HOURS AS NEEDED 100 0     LORazepam (ATIVAN) 0.5 MG tablet Take 0.5-1 tablets (0.25-0.5 mg) by mouth every 8 hours as needed for anxiety Take 30  minutes prior to departure.  Do not operate a vehicle after taking this medication (Patient not taking: Reported on 6/23/2017) 15 tablet 0     zolpidem (AMBIEN) 5 MG tablet Take 1 tablet (5 mg) by mouth nightly as needed for sleep (Patient not taking: Reported on 6/23/2017) 30 tablet 5     [DISCONTINUED] valACYclovir (VALTREX) 1000 mg tablet Take 2 tablets (2,000 mg) by mouth 2 times daily (Patient not taking: Reported on 6/23/2017) 4 tablet 5     acyclovir (ZOVIRAX) 5 % cream To affected areas (Patient not taking: Reported on 6/23/2017) 1 g 11     Allergies   Allergen Reactions     No Known Drug Allergies      Recent Labs   Lab Test  08/22/16   0843  08/14/14   1443  08/11/10   0947   LDL  103*   --   64   HDL  101   --   80   TRIG  100   --   147   ALT   --   24   --    CR   --   0.68   --    GFRESTIMATED   --   >90  Non  GFR Calc     --    GFRESTBLACK   --   >90   GFR Calc     --    POTASSIUM   --   4.2   --               Patient over age 50, mutual decision to screen reflected in health maintenance.      Ma Diagnostic Digital Bilateral    Result Date: 8/21/2014  Narrative: Examination: Bilateral digital diagnostic mammography with computer aided detection and bilateral breast ultrasound Comparison: Mammograms 7/24/2013 and 7/27/2012, breast ultrasound 7/19/2011 History: Left breast lump. Known breast cysts. BREAST DENSITY: Heterogeneously dense Findings: There are multiple circumscribed masses in both breasts, some that appear larger. No suspicious calcifications. Ultrasound of the right breast at the 12:00 position 3 cm from the nipple demonstrates a simple 20 mm cyst. Ultrasound of the left breast 12:00 position 3 cm from the nipple corresponding to the region of palpable abnormality reported by the referring physician demonstrates a 21 mm simple cyst. There are multiple additional simple cysts in both breasts. Ultrasound was performed by the sonographer and the radiologist.      Ma Screening Digital Bilateral    Result Date: 7/12/2017  Narrative: Examination: Bilateral digital screening mammography with computer aided detection. Comparison: 6/10/2016, 8/21/2015, 8/18/2014, 7/24/2013, 7/27/2012 History: No symptoms, routine screening. Two or more close blood relatives with breast cancer, at least one below age 50. Known bilateral breast cysts. BREAST DENSITY: Extremely dense. COMMENTS: No significant change.      Ma Screening Digital Bilateral    Result Date: 8/24/2015  Narrative: SCREENING MAMMOGRAM, BILATERAL, DIGITAL w/CAD - 8/21/2015 4:18 PM. HISTORY:  Routine screening. No current breast problems noted. 2 second degree relatives with history of breast cancer diagnosed in the fifth decade. A first cousin with tested positive for BRCA gene mutation. COMPARISON:  8/18/2014, 7/24/2013, 7/27/2012, 7/19/2011 BREAST DENSITY: Extremely dense. COMMENTS: No significant change.         Us Breast Bilateral Limited 1-3 Quadrants    Result Date: 6/13/2016  Narrative: Examination: Bilateral digital diagnostic mammography and digital breast tomosynthesis with computer aided detection, and focussed ultrasound of both breasts, 6/10/2016. Comparison: 8/21/2015, 18 2014, 7/24/2013 and 7/27/2012 History: Tender left breast swelling for the past couple of weeks. Patient states that she is currently going through menopause. 2 or more close relatives with breast cancer, the youngest under age 50. BREAST DENSITY: Extremely dense Findings: No significant mammographic change. Focused ultrasound of both breasts was performed by radiologist and technologist. Multiple benign cysts seen in both breasts, some of which have layering debris.     Us Breast Left    Result Date: 8/21/2014  Narrative: Examination: Bilateral digital diagnostic mammography with computer aided detection and bilateral breast ultrasound Comparison: Mammograms 7/24/2013 and 7/27/2012, breast ultrasound 7/19/2011 History: Left breast lump.  Known breast cysts. BREAST DENSITY: Heterogeneously dense Findings: There are multiple circumscribed masses in both breasts, some that appear larger. No suspicious calcifications. Ultrasound of the right breast at the 12:00 position 3 cm from the nipple demonstrates a simple 20 mm cyst. Ultrasound of the left breast 12:00 position 3 cm from the nipple corresponding to the region of palpable abnormality reported by the referring physician demonstrates a 21 mm simple cyst. There are multiple additional simple cysts in both breasts. Ultrasound was performed by the sonographer and the radiologist.     Pertinent mammograms are reviewed under the imaging tab.  History of abnormal Pap smear:   NO - age 30-65 PAP every 5 years with negative HPV co-testing recommended  Last 3 Pap Results:   PAP (no units)   Date Value   2014 NIL   2011 NIL   2010 NIL       Reviewed and updated as needed this visit by clinical staff  Tobacco  Allergies  Med Hx  Surg Hx  Fam Hx  Soc Hx        Reviewed and updated as needed this visit by Provider        Past Medical History:   Diagnosis Date     Herpes simplex without mention of complication      Unspecified contraceptive management       Past Surgical History:   Procedure Laterality Date     HC TOOTH EXTRACTION W/FORCEP       TONSILLECTOMY       Obstetric History       T2      L2     SAB0   TAB0   Ectopic0   Multiple0   Live Births0       # Outcome Date GA Lbr Raul/2nd Weight Sex Delivery Anes PTL Lv   5 Term            4 Term            3 SAB            2 SAB            1 SAB                   ROS:  10 point ROS is negative except as noted above.      This document serves as a record of the services and decisions personally performed and made by Christal Keith MD. It was created on her behalf by Claudia Portillo, a trained medical scribe. The creation of this document is based on the provider's statements to the medical scribe.  Claudia Portillo 8:35 AM  "August 17, 2017    OBJECTIVE:   /76 (BP Location: Left arm, Patient Position: Sitting, Cuff Size: Adult Regular)  Pulse 68  Temp 98  F (36.7  C) (Oral)  Resp 19  Ht 1.638 m (5' 4.5\")  Wt 67.8 kg (149 lb 8 oz)  LMP 10/10/2016  SpO2 98%  BMI 25.27 kg/m2  EXAM:  GENERAL: healthy, alert and no distress  EYES: Eyes grossly normal to inspection, PERRL and conjunctivae and sclerae normal  HENT: ear canals and TM's normal, nose and mouth without ulcers or lesions  NECK: no adenopathy, no asymmetry, masses, or scars and thyroid normal to palpation  RESP: lungs clear to auscultation - no rales, rhonchi or wheezes  BREAST: normal without masses, tenderness or nipple discharge and no palpable axillary masses or adenopathy  CV: regular rate and rhythm, normal S1 S2, no S3 or S4, no murmur, click or rub, no peripheral edema and peripheral pulses strong  ABDOMEN: soft, nontender, no hepatosplenomegaly, no masses and bowel sounds normal   (female): normal female external genitalia, normal urethral meatus, vaginal mucosa pink, moist, well rugated, and normal cervix  MS: no gross musculoskeletal defects noted, no edema  SKIN: no suspicious lesions or rashes  NEURO: Normal strength and tone, mentation intact and speech normal  PSYCH: mentation appears normal, affect normal/bright    ASSESSMENT/PLAN:   1. Routine general medical examination at a health care facility  Pap smear today. Mammogram done in July 2017. Colonoscopy done June 2016.    2. Menopausal syndrome (hot flashes)  She denies any current hot flashes. Has prescription for paroxetine if hot flashes recur when school starts.     3. Encounter for routine adult health examination without abnormal findings  Uses with flare ups. Refill provided.   - valACYclovir (VALTREX) 1000 mg tablet; Take 2 tablets (2,000 mg) by mouth 2 times daily  Dispense: 4 tablet; Refill: 5    4. Cervical cancer screening  Pap smear updated. Will notify patient of results.   - Pap " "imaged thin layer screen with HPV - recommended age 30 - 65  - HPV High Risk Types DNA Cervical    5. Lipid screening  - Lipid panel reflex to direct LDL    6. Screening for diabetes mellitus  - Glucose    7. Routine screening for STI (sexually transmitted infection)  Pt requested STI screening today. No recent known exposure or concern for infection.     - HIV Antigen Antibody Combo  - Anti Treponema  - NEISSERIA GONORRHOEA PCR  - CHLAMYDIA TRACHOMATIS PCR    COUNSELING:  Reviewed preventive health counseling, as reflected in patient instructions       Regular exercise       Healthy diet/nutrition       reports that she has never smoked. She has never used smokeless tobacco.  Estimated body mass index is 25.27 kg/(m^2) as calculated from the following:    Height as of this encounter: 1.638 m (5' 4.5\").    Weight as of this encounter: 67.8 kg (149 lb 8 oz).     Counseling Resources:  ATP IV Guidelines  Pooled Cohorts Equation Calculator  Breast Cancer Risk Calculator  FRAX Risk Assessment  ICSI Preventive Guidelines  Dietary Guidelines for Americans, 2010  USDA's MyPlate  ASA Prophylaxis  Lung CA Screening    The information in this document, created by the medical scribe for me, accurately reflects the services I personally performed and the decisions made by me. I have reviewed and approved this document for accuracy prior to leaving the patient care area.  August 17, 2017 8:36 AM    Christal Keith MD, MD  [facility]      "

## 2017-08-18 LAB
C TRACH DNA SPEC QL NAA+PROBE: NEGATIVE
COPATH REPORT: NORMAL
HIV 1+2 AB+HIV1 P24 AG SERPL QL IA: NONREACTIVE
N GONORRHOEA DNA SPEC QL NAA+PROBE: NEGATIVE
PAP: NORMAL
SPECIMEN SOURCE: NORMAL
SPECIMEN SOURCE: NORMAL

## 2017-08-21 PROBLEM — R87.810 CERVICAL HIGH RISK HPV (HUMAN PAPILLOMAVIRUS) TEST POSITIVE: Status: ACTIVE | Noted: 2017-08-17

## 2017-08-21 LAB
FINAL DIAGNOSIS: ABNORMAL
HPV HR 12 DNA CVX QL NAA+PROBE: POSITIVE
HPV16 DNA SPEC QL NAA+PROBE: NEGATIVE
HPV18 DNA SPEC QL NAA+PROBE: NEGATIVE
SPECIMEN DESCRIPTION: ABNORMAL

## 2017-08-21 NOTE — PROGRESS NOTES
"Edith Gill  Your attached labs are normal/negative.  Please contact the office with any questions or concerns.    Doreen Young \"Guido\" SHIRLEY Heard for Dr. Keith while she is out of the office  "

## 2017-08-21 NOTE — PROGRESS NOTES
08/17/17: NIL pap, + HR HPV (not 16 or 18) result. Plan cotest in 1 year. Mychart message sent to the pt with the results and recommendations. Pt not viewing mychart messages.  08/29/17: Result letter printed and sent at request of RN. (es)   8/16/18 LSIL, +HR HPV, not 16/18. Plan colp by 11/16/18. (LN)  08/27/18: Msg left to call back. Pt was advised.  09/10/18: [place] Focal squamous atypia, chronic inflammation, neg for dysplasia. Plan cotest in 1 year. Provider sent a mychart message to the pt with the results and recommendations. Pt viewed this mychart message.   07/23/19: NIL Pap, Neg HR HPV result. Plan cotest in 3 years. Pt was advised.

## 2018-07-24 DIAGNOSIS — G47.00 INSOMNIA, UNSPECIFIED TYPE: ICD-10-CM

## 2018-07-24 DIAGNOSIS — Z00.00 ENCOUNTER FOR ROUTINE ADULT HEALTH EXAMINATION WITHOUT ABNORMAL FINDINGS: ICD-10-CM

## 2018-07-25 NOTE — TELEPHONE ENCOUNTER
"ZOLPIDEM 5MG TABLETS    Last Written Prescription Date:  4/6/2017  Last Fill Quantity: 30 tablet,   # refills: 5  Last Office Visit: 8/17/2017  Future Office visit:    Next 5 appointments (look out 90 days)     Aug 16, 2018 10:20 AM CDT   PHYSICAL with Christal Keith MD   [facility]    [address] [place] [id number] [phone]                   Routing refill request to provider for review/approval because:  Drug not on the [facility], [facility] or [facility] refill protocol or controlled substance    ____________________________________________________________     Requested Prescriptions   Pending Prescriptions Disp Refills     valACYclovir (VALTREX) 1000 mg tablet [Pharmacy Med Name: VALACYCLOVIR 1GM TABLETS]  Last Written Prescription Date:  8/17/2017  Last Fill Quantity: 4 tablet,  # refills: 5   Last Office Visit: 8/17/2017   Future Office Visit:    Next 5 appointments (look out 90 days)     Aug 16, 2018 10:20 AM CDT   PHYSICAL with Christal Keith MD   [facility]    [address] [place] [id number] [phone]                4 tablet 0     Sig: TAKE 2 TABLETS(2000 MG) BY MOUTH TWICE DAILY    Antivirals for Herpes Protocol Failed    7/24/2018 12:33 PM       Failed - Normal serum creatinine on file in past 12 months    Recent Labs   Lab Test  08/14/14   1443   CR  0.68            Passed - Patient is age 12 or older       Passed - Recent (12 mo) or future (30 days) visit within the authorizing provider's specialty    Patient had office visit in the last 12 months or has a visit in the next 30 days with authorizing provider or within the authorizing provider's specialty.  See \"Patient Info\" tab in inbasket, or \"Choose Columns\" in Meds & Orders section of the refill encounter.                                 "

## 2018-07-30 RX ORDER — VALACYCLOVIR HYDROCHLORIDE 1 G/1
TABLET, FILM COATED ORAL
Qty: 4 TABLET | Refills: 0 | Status: SHIPPED | OUTPATIENT
Start: 2018-07-30 | End: 2020-08-25

## 2018-07-30 RX ORDER — ZOLPIDEM TARTRATE 5 MG/1
TABLET ORAL
Qty: 30 TABLET | Refills: 0 | Status: SHIPPED | OUTPATIENT
Start: 2018-07-30 | End: 2018-08-28

## 2018-08-16 ENCOUNTER — OFFICE VISIT (OUTPATIENT)
Dept: FAMILY MEDICINE | Facility: CLINIC | Age: 53
End: 2018-08-16
Payer: COMMERCIAL

## 2018-08-16 ENCOUNTER — TELEPHONE (OUTPATIENT)
Dept: FAMILY MEDICINE | Facility: CLINIC | Age: 53
End: 2018-08-16

## 2018-08-16 VITALS
SYSTOLIC BLOOD PRESSURE: 102 MMHG | HEIGHT: 65 IN | HEART RATE: 56 BPM | OXYGEN SATURATION: 99 % | BODY MASS INDEX: 24.66 KG/M2 | DIASTOLIC BLOOD PRESSURE: 72 MMHG | TEMPERATURE: 97.6 F | WEIGHT: 148 LBS

## 2018-08-16 DIAGNOSIS — F40.298 ISOLATED OR SPECIFIC PHOBIA: ICD-10-CM

## 2018-08-16 DIAGNOSIS — G47.00 INSOMNIA, UNSPECIFIED TYPE: ICD-10-CM

## 2018-08-16 DIAGNOSIS — Z13.6 CARDIOVASCULAR SCREENING; LDL GOAL LESS THAN 160: ICD-10-CM

## 2018-08-16 DIAGNOSIS — Z13.1 SCREENING FOR DIABETES MELLITUS: ICD-10-CM

## 2018-08-16 DIAGNOSIS — Z00.00 ENCOUNTER FOR ROUTINE ADULT HEALTH EXAMINATION WITHOUT ABNORMAL FINDINGS: Primary | ICD-10-CM

## 2018-08-16 DIAGNOSIS — R87.810 CERVICAL HIGH RISK HPV (HUMAN PAPILLOMAVIRUS) TEST POSITIVE: ICD-10-CM

## 2018-08-16 DIAGNOSIS — N95.1 MENOPAUSAL SYNDROME (HOT FLASHES): ICD-10-CM

## 2018-08-16 PROCEDURE — 88141 CYTOPATH C/V INTERPRET: CPT | Performed by: FAMILY MEDICINE

## 2018-08-16 PROCEDURE — 88175 CYTOPATH C/V AUTO FLUID REDO: CPT | Performed by: FAMILY MEDICINE

## 2018-08-16 PROCEDURE — 87624 HPV HI-RISK TYP POOLED RSLT: CPT | Performed by: FAMILY MEDICINE

## 2018-08-16 PROCEDURE — 99396 PREV VISIT EST AGE 40-64: CPT | Performed by: FAMILY MEDICINE

## 2018-08-16 RX ORDER — PAROXETINE 7.5 MG/1
1 CAPSULE ORAL AT BEDTIME
Qty: 30 CAPSULE | Refills: 1 | Status: SHIPPED | OUTPATIENT
Start: 2018-08-16 | End: 2018-10-14

## 2018-08-16 NOTE — PROGRESS NOTES
SUBJECTIVE:   CC: Bridget Coyne is an 52 year old woman who presents for preventive health visit.     Physical   Annual:     Getting at least 3 servings of Calcium per day:  Yes    Bi-annual eye exam:  Yes    Dental care twice a year:  Yes    Sleep apnea or symptoms of sleep apnea:  None    Diet:  Regular (no restrictions)    Frequency of exercise:  6-7 days/week    Duration of exercise:  45-60 minutes    Taking medications regularly:  Yes    Medication side effects:  None    Additional concerns today:  No            Today's PHQ-2 Score:   PHQ-2 ( 1999 [employer]) 8/16/2018   Q1: Little interest or pleasure in doing things 0   Q2: Feeling down, depressed or hopeless 0   PHQ-2 Score 0   Q1: Little interest or pleasure in doing things Not at all   Q2: Feeling down, depressed or hopeless Not at all   PHQ-2 Score 0       Abuse: Current or Past(Physical, Sexual or Emotional)- No  Do you feel safe in your environment - Yes    Social History   Substance Use Topics     Smoking status: Never Smoker     Smokeless tobacco: Never Used     Alcohol use Yes      Comment: 6 drinks per week     Alcohol Use 8/16/2018   If you drink alcohol do you typically have greater than 3 drinks per day OR greater than 7 drinks per week? Yes   AUDIT SCORE  5       Reviewed orders with patient.  Reviewed health maintenance and updated orders accordingly - Yes  BP Readings from Last 3 Encounters:   08/16/18 102/72   08/17/17 108/76   06/23/17 102/72    Wt Readings from Last 3 Encounters:   08/16/18 148 lb (67.1 kg)   08/17/17 149 lb 8 oz (67.8 kg)   06/23/17 146 lb (66.2 kg)                  Patient Active Problem List   Diagnosis     Contraceptive management     Herpes simplex virus (HSV) infection     CARDIOVASCULAR SCREENING; LDL GOAL LESS THAN 160     Insomnia     Cervical high risk HPV (human papillomavirus) test positive     Past Surgical History:   Procedure Laterality Date     HC TOOTH EXTRACTION W/FORCEP       TONSILLECTOMY         Social  History   Substance Use Topics     Smoking status: Never Smoker     Smokeless tobacco: Never Used     Alcohol use Yes      Comment: 6 drinks per week     Family History   Problem Relation Age of Onset     Breast Cancer Paternal Grandmother      and stomach cancer     Cancer - colorectal Maternal Grandmother      Cancer Maternal Grandmother      colon cancer     HEART DISEASE Maternal Grandmother      heart attack     Cancer Maternal Aunt      brain cancer     Neurologic Disorder Father      parkinson's disease     Liver Disease Father 70     Hypertension Mother      Lipids Mother      Diabetes Brother      on oral agent     Breast Cancer Other      cousin     Cancer Paternal Uncle      lung         Current Outpatient Prescriptions   Medication Sig Dispense Refill     acyclovir (ZOVIRAX) 5 % cream To affected areas 1 g 11     ADVIL 200 MG OR CAPS 1 CAPSULE EVERY 4 TO 6 HOURS AS NEEDED 100 0     PARoxetine Mesylate 7.5 MG CAPS Take 1 tablet by mouth At Bedtime 30 capsule 1     valACYclovir (VALTREX) 1000 mg tablet TAKE 2 TABLETS(2000 MG) BY MOUTH TWICE DAILY 4 tablet 0     valACYclovir (VALTREX) 1000 mg tablet Take 2 tablets (2,000 mg) by mouth 2 times daily 4 tablet 5     zolpidem (AMBIEN) 5 MG tablet TAKE 1 TABLET BY MOUTH EVERY DAY AT BEDTIME AS NEEDED FOR SLEEP 30 tablet 0     LORazepam (ATIVAN) 0.5 MG tablet Take 0.5-1 tablets (0.25-0.5 mg) by mouth every 8 hours as needed for anxiety Take 30 minutes prior to departure.  Do not operate a vehicle after taking this medication (Patient not taking: Reported on 8/16/2018) 15 tablet 0     Allergies   Allergen Reactions     No Known Drug Allergies      Recent Labs   Lab Test  08/17/17   0839  08/22/16   0843  08/14/14   1443  08/11/10   0947   LDL  80  103*   --   64   HDL  118  101   --   80   TRIG  80  100   --   147   ALT   --    --   24   --    CR   --    --   0.68   --    GFRESTIMATED   --    --   >90  Non  GFR Calc     --    GFRESTBLACK   --    --   " >90   GFR Calc     --    POTASSIUM   --    --   4.2   --         Patient over age 50, mutual decision to screen reflected in health maintenance.    Pertinent mammograms are reviewed under the imaging tab.  History of abnormal Pap smear:   Last 3 Pap and HPV Results:   PAP / HPV Latest Ref Rng & Units 2017   PAP - NIL NIL NIL   HPV 16 DNA NEG:Negative Negative - -   HPV 18 DNA NEG:Negative Negative - -   OTHER HR HPV NEG:Negative Positive(A) - -     PAP / HPV Latest Ref Rng & Units 2017   PAP - NIL NIL NIL   HPV 16 DNA NEG:Negative Negative - -   HPV 18 DNA NEG:Negative Negative - -   OTHER HR HPV NEG:Negative Positive(A) - -     Reviewed and updated as needed this visit by clinical staff  Tobacco  Allergies  Meds         Reviewed and updated as needed this visit by Provider        Past Medical History:   Diagnosis Date     Cervical high risk HPV (human papillomavirus) test positive 2017: NIL pap, + HR HPV (not 16 or 18) result.      Herpes simplex without mention of complication      Unspecified contraceptive management       Past Surgical History:   Procedure Laterality Date     HC TOOTH EXTRACTION W/FORCEP       TONSILLECTOMY       Obstetric History       T2      L2     SAB3   TAB0   Ectopic0   Multiple0   Live Births0       # Outcome Date GA Lbr Raul/2nd Weight Sex Delivery Anes PTL Lv   5 Term            4 Term            3 SAB            2 SAB            1 SAB                   Review of Systems   ROS: 10 point ROS neg other than the symptoms noted above in the HPI.      OBJECTIVE:   /72  Pulse 56  Temp 97.6  F (36.4  C) (Oral)  Ht 5' 4.6\" (1.641 m)  Wt 148 lb (67.1 kg)  LMP 10/10/2016  SpO2 99%  BMI 24.93 kg/m2  Physical Exam  GENERAL APPEARANCE: healthy, alert and no distress  EYES: Eyes grossly normal to inspection, PERRL and conjunctivae and sclerae normal  HENT: ear canals and TM's normal, nose " and mouth without ulcers or lesions, oropharynx clear and oral mucous membranes moist  NECK: no adenopathy, no asymmetry, masses, or scars and thyroid normal to palpation  RESP: lungs clear to auscultation - no rales, rhonchi or wheezes  BREAST: normal without masses, tenderness or nipple discharge and no palpable axillary masses or adenopathy  CV: regular rate and rhythm, normal S1 S2, no S3 or S4, no murmur, click or rub, no peripheral edema and peripheral pulses strong  ABDOMEN: soft, nontender, no hepatosplenomegaly, no masses and bowel sounds normal   (female): normal female external genitalia, normal urethral meatus, vaginal mucosal atrophy noted, normal cervix  MS: no musculoskeletal defects are noted and gait is age appropriate without ataxia  SKIN: no suspicious lesions or rashes  NEURO: Normal strength and tone, sensory exam grossly normal, mentation intact and speech normal  PSYCH: mentation appears normal and affect normal/bright    Diagnostic Test Results:  none     ASSESSMENT/PLAN:   1. Encounter for routine adult health examination without abnormal findings   pap today  She will schedule mammogram   She is due for colonoscopy in 2021  2. Cervical high risk HPV (human papillomavirus) test positive     - HPV High Risk Types DNA Cervical  - Pap imaged thin layer diagnostic with HPV (select HPV order below)    3. CARDIOVASCULAR SCREENING; LDL GOAL LESS THAN 160   not fasting today, needs fasting labs for work reward program, will return for fasting lab visit   - Lipid panel reflex to direct LDL Fasting; Future    4. Screening for diabetes mellitus   as above   - Glucose; Future    5. Menopausal syndrome (hot flashes)   has not tried paxil yet, but would like it for when the school year starts as that is when her hot flashes are worse.   - PARoxetine Mesylate 7.5 MG CAPS; Take 1 tablet by mouth At Bedtime  Dispense: 30 capsule; Refill: 1    COUNSELING:  Reviewed preventive health counseling, as reflected  "in patient instructions    BP Readings from Last 1 Encounters:   08/16/18 102/72     Estimated body mass index is 24.93 kg/(m^2) as calculated from the following:    Height as of this encounter: 5' 4.6\" (1.641 m).    Weight as of this encounter: 148 lb (67.1 kg).           reports that she has never smoked. She has never used smokeless tobacco.      Counseling Resources:  ATP IV Guidelines  Pooled Cohorts Equation Calculator  Breast Cancer Risk Calculator  FRAX Risk Assessment  ICSI Preventive Guidelines  Dietary Guidelines for Americans, 2010  [employer]'s MyPlate  ASA Prophylaxis  Lung CA Screening    Christal Keith MD  [facility]  Answers for HPI/ROS submitted by the patient on 8/16/2018   PHQ-2 Score: 0    "

## 2018-08-16 NOTE — TELEPHONE ENCOUNTER
Reason for Call:  Medication or medication refill:    Do you use a [facility]?  Name of the pharmacy and phone number for the current request:  [facility] [id number] [place] [facility] - [place], [place] - [address]    Name of the medication requested: LORazepam (ATIVAN) 0.5 MG tablet    Other request: Patient saw Dr. Keith today and thought this medication was prescribed with her other. She is requesting a refill on this and to make sure the 2 medications from 7/30/2018 were sent as well. Informed her one was sent electronically and one was sent 'local print' (assuming faxed) and if she wants those transferred - to call [facility] in [facility] and they will move them for her. Please assist. Thanks!    Can we leave a detailed message on this number? YES    Phone number patient can be reached at: Home number on file [phone] (home)    Best Time: Any    Call taken on 8/16/2018 at 12:09 PM by Karen Frausto

## 2018-08-16 NOTE — PATIENT INSTRUCTIONS

## 2018-08-16 NOTE — MR AVS SNAPSHOT
After Visit Summary   8/16/2018    Bridget Coyne    MRN: [id number]           Patient Information     Date Of Birth          1965        Visit Information        Provider Department      8/16/2018 10:20 AM Christal Keith MD [facility]        Today's Diagnoses     Encounter for routine adult health examination without abnormal findings    -  1    Cervical high risk HPV (human papillomavirus) test positive        CARDIOVASCULAR SCREENING; LDL GOAL LESS THAN 160        Screening for diabetes mellitus        Menopausal syndrome (hot flashes)          Care Instructions      Preventive Health Recommendations  Female Ages 50 - 64    Yearly exam: See your health care provider every year in order to  o Review health changes.   o Discuss preventive care.    o Review your medicines if your doctor has prescribed any.      Get a Pap test every three years (unless you have an abnormal result and your provider advises testing more often).    If you get Pap tests with HPV test, you only need to test every 5 years, unless you have an abnormal result.     You do not need a Pap test if your uterus was removed (hysterectomy) and you have not had cancer.    You should be tested each year for STDs (sexually transmitted diseases) if you're at risk.     Have a mammogram every 1 to 2 years.    Have a colonoscopy at age 50, or have a yearly FIT test (stool test). These exams screen for colon cancer.      Have a cholesterol test every 5 years, or more often if advised.    Have a diabetes test (fasting glucose) every three years. If you are at risk for diabetes, you should have this test more often.     If you are at risk for osteoporosis (brittle bone disease), think about having a bone density scan (DEXA).    Shots: Get a flu shot each year. Get a tetanus shot every 10 years.    Nutrition:     Eat at least 5 servings of fruits and vegetables each day.    Eat whole-grain bread, whole-wheat pasta and brown  rice instead of white grains and rice.    Get adequate Calcium and Vitamin D.     Lifestyle    Exercise at least 150 minutes a week (30 minutes a day, 5 days a week). This will help you control your weight and prevent disease.    Limit alcohol to one drink per day.    No smoking.     Wear sunscreen to prevent skin cancer.     See your dentist every six months for an exam and cleaning.    See your eye doctor every 1 to 2 years.    1) Schedule your mammogram            Follow-ups after your visit        Future tests that were ordered for you today     Open Future Orders        Priority Expected Expires Ordered    Lipid panel reflex to direct LDL Fasting Routine  8/16/2019 8/16/2018    Glucose Routine  8/16/2019 8/16/2018            Who to contact     If you have questions or need follow up information about today's clinic visit or your schedule please contact [facility] directly at [phone].  Normal or non-critical lab and imaging results will be communicated to you by MyChart, letter or phone within 4 business days after the clinic has received the results. If you do not hear from us within 7 days, please contact the clinic through [employer] or phone. If you have a critical or abnormal lab result, we will notify you by phone as soon as possible.  Submit refill requests through [employer] or call your pharmacy and they will forward the refill request to us. Please allow 3 business days for your refill to be completed.          Additional Information About Your Visit        MyChart Information     [employer] gives you secure access to your electronic health record. If you see a primary care provider, you can also send messages to your care team and make appointments. If you have questions, please call your primary care clinic.  If you do not have a primary care provider, please call [phone] and they will assist you.        Care EveryWhere ID     This is your Care EveryWhere ID. This could be used by  "other organizations to access your [place] medical records  [id number]        Your Vitals Were     Pulse Temperature Height Last Period Pulse Oximetry BMI (Body Mass Index)    56 97.6  F (36.4  C) (Oral) 5' 4.6\" (1.641 m) 10/10/2016 99% 24.93 kg/m2       Blood Pressure from Last 3 Encounters:   08/16/18 102/72   08/17/17 108/76   06/23/17 102/72    Weight from Last 3 Encounters:   08/16/18 148 lb (67.1 kg)   08/17/17 149 lb 8 oz (67.8 kg)   06/23/17 146 lb (66.2 kg)              We Performed the Following     HPV High Risk Types DNA Cervical     Pap imaged thin layer diagnostic with HPV (select HPV order below)          Where to get your medicines      These medications were sent to [id number] - [place], [place] - [address]  [address], [place] [address]     Phone:  [phone]     PARoxetine Mesylate 7.5 MG Caps          Primary Care Provider Office Phone # Fax #    Christal Keith [phone] [address]  [facility] [address]        Equal Access to Services     OTTO WEST : Hadii lu xieo Sokishore, waaxda luqadaha, qaybta kaalmada adeegyada, ravin spangler. So [facility] [phone].    ATENCIÓN: Si habla español, tiene a terry disposición servicios gratuitos de asistencia lingüística. Llame al [phone].    We comply with applicable federal civil rights laws and [place] laws. We do not discriminate on the basis of race, color, national origin, age, disability, sex, sexual orientation, or gender identity.            Thank you!     Thank you for choosing [facility]  for your care. Our goal is always to provide you with excellent care. Hearing back from our patients is one way we can continue to improve our services. Please take a few minutes to complete the written survey that you may receive in the mail after your visit with us. Thank you!             Your Updated Medication List - Protect others around you: Learn how " to safely use, store and throw away your medicines at www.disposemymeds.org.          This list is accurate as of 8/16/18 10:54 AM.  Always use your most recent med list.                   Brand Name Dispense Instructions for use Diagnosis    acyclovir 5 % cream    ZOVIRAX    1 g    To affected areas    Herpes simplex without mention of complication       ADVIL 200 MG capsule   Generic drug:  ibuprofen     100    1 CAPSULE EVERY 4 TO 6 HOURS AS NEEDED    Back pain       LORazepam 0.5 MG tablet    ATIVAN    15 tablet    Take 0.5-1 tablets (0.25-0.5 mg) by mouth every 8 hours as needed for anxiety Take 30 minutes prior to departure.  Do not operate a vehicle after taking this medication    Isolated or specific phobia       PARoxetine Mesylate 7.5 MG Caps     30 capsule    Take 1 tablet by mouth At Bedtime    Menopausal syndrome (hot flashes)       * valACYclovir 1000 mg tablet    VALTREX    4 tablet    Take 2 tablets (2,000 mg) by mouth 2 times daily    Encounter for routine adult health examination without abnormal findings       * valACYclovir 1000 mg tablet    VALTREX    4 tablet    TAKE 2 TABLETS(2000 MG) BY MOUTH TWICE DAILY    Encounter for routine adult health examination without abnormal findings       zolpidem 5 MG tablet    AMBIEN    30 tablet    TAKE 1 TABLET BY MOUTH EVERY DAY AT BEDTIME AS NEEDED FOR SLEEP    Insomnia, unspecified type       * Notice:  This list has 2 medication(s) that are the same as other medications prescribed for you. Read the directions carefully, and ask your doctor or other care provider to review them with you.

## 2018-08-16 NOTE — TELEPHONE ENCOUNTER
Dr Keith -   Had you intended to give patient Lorazepam at today's appointment?  Spoke to patient and she had never filled prescriptions from 7/30/18 so will have those prescriptions moved to [facility].  Lina Muhammad RN

## 2018-08-22 LAB
COPATH REPORT: ABNORMAL
PAP: ABNORMAL

## 2018-08-23 LAB
FINAL DIAGNOSIS: ABNORMAL
HPV HR 12 DNA CVX QL NAA+PROBE: POSITIVE
HPV16 DNA SPEC QL NAA+PROBE: NEGATIVE
HPV18 DNA SPEC QL NAA+PROBE: NEGATIVE
SPECIMEN DESCRIPTION: ABNORMAL
SPECIMEN SOURCE CVX/VAG CYTO: ABNORMAL

## 2018-08-28 NOTE — TELEPHONE ENCOUNTER
I talked to the pt.  She got the meds she needed transferred to her new pharmacy, [facility] on [facility] [address] [facility] in [place].      She is wanting refills on lorazepam and ambien.  I see on med list, the lorazepam says not taking. Pt says she takes this when flying. Only uses PRN when flying.  She was also wanting a refill on the ambien.    Both meds are pended.  I did tell pt that these controlled meds need to be discussed in an Office visit. Pt felt she may have run out of time at her last ov on 8/16/18.  RC Shabazz

## 2018-08-28 NOTE — TELEPHONE ENCOUNTER
I did not give it at the appointment, but am okay with a refill/transfer to different pharmacy. It looks like the rx was already transferred. Let me know if there is anything additional you need from me. Christal Keith M.D.

## 2018-08-29 RX ORDER — ZOLPIDEM TARTRATE 5 MG/1
5 TABLET ORAL
Qty: 30 TABLET | Refills: 0 | Status: SHIPPED | OUTPATIENT
Start: 2018-08-29 | End: 2019-08-12

## 2018-08-29 RX ORDER — LORAZEPAM 0.5 MG/1
0.25-0.5 TABLET ORAL EVERY 8 HOURS PRN
Qty: 15 TABLET | Refills: 0 | Status: SHIPPED | OUTPATIENT
Start: 2018-08-29 | End: 2019-08-12

## 2018-08-31 NOTE — TELEPHONE ENCOUNTER
I have attempted to contact this patient by phone with the following results:  Pt want to send it to [facility] .      Rx faxed to [facility] [place], [place] Zoua Vang MA

## 2018-09-10 ENCOUNTER — OFFICE VISIT (OUTPATIENT)
Dept: OBGYN | Facility: CLINIC | Age: 53
End: 2018-09-10
Payer: COMMERCIAL

## 2018-09-10 VITALS
WEIGHT: 147 LBS | DIASTOLIC BLOOD PRESSURE: 83 MMHG | SYSTOLIC BLOOD PRESSURE: 149 MMHG | OXYGEN SATURATION: 98 % | HEART RATE: 91 BPM | BODY MASS INDEX: 24.77 KG/M2

## 2018-09-10 DIAGNOSIS — R87.612 PAPANICOLAOU SMEAR OF CERVIX WITH LOW GRADE SQUAMOUS INTRAEPITHELIAL LESION (LGSIL): Primary | ICD-10-CM

## 2018-09-10 PROCEDURE — 88342 IMHCHEM/IMCYTCHM 1ST ANTB: CPT | Performed by: OBSTETRICS & GYNECOLOGY

## 2018-09-10 PROCEDURE — 99201 ZZC OFFICE/OUTPT VISIT, NEW, LEVEL I: CPT | Mod: 25 | Performed by: OBSTETRICS & GYNECOLOGY

## 2018-09-10 PROCEDURE — 88305 TISSUE EXAM BY PATHOLOGIST: CPT | Performed by: OBSTETRICS & GYNECOLOGY

## 2018-09-10 PROCEDURE — 57454 BX/CURETT OF CERVIX W/SCOPE: CPT | Performed by: OBSTETRICS & GYNECOLOGY

## 2018-09-10 NOTE — MR AVS SNAPSHOT
After Visit Summary   9/10/2018    Bridget Coyne    MRN: [id number]           Patient Information     Date Of Birth          1965        Visit Information        Provider Department      9/10/2018 2:30 PM Bharti Patel MD; RD [facility]        Today's Diagnoses     [place] for LSIL pap, other HPV +    -  1      Care Instructions    Colposcopy Post-Procedure Patient Instructions      You may experience any of the following for a couple of days following colposcopy:    Mild cramping    Vaginal bleeding     Vaginal discharge that looks black and clumpy    Please call your healthcare provider if you have any of the following symptoms:    Fever--Temperature greater than 100 degrees    Cramping after 48 hours    Bleeding heavier than a normal period in the first 24-48 hours    If you are bleeding and soaking more than one pad an hour    Or any other worrisome problems.    We recommend that:    You use pads, not tampons for the bleeding.    You may resume sexual activity in 2-3 days, or after bleeding stops.    You may use Tylenol or ibuprofen (Motrin or Advil) for any discomfort.      [facility] - OB[facility] : [phone]            Follow-ups after your visit        Who to contact     If you have questions or need follow up information about today's clinic visit or your schedule please contact [facility] directly at [phone].  Normal or non-critical lab and imaging results will be communicated to you by MyChart, letter or phone within 4 business days after the clinic has received the results. If you do not hear from us within 7 days, please contact the clinic through MyChart or phone. If you have a critical or abnormal lab result, we will notify you by phone as soon as possible.  Submit refill requests through [employer] or call your pharmacy and they will forward the refill request to us. Please allow 3 business days for your refill to be completed.           Additional Information About Your Visit        MyChart Information     [employer] gives you secure access to your electronic health record. If you see a primary care provider, you can also send messages to your care team and make appointments. If you have questions, please call your primary care clinic.  If you do not have a primary care provider, please call [phone] and they will assist you.        Care EveryWhere ID     This is your Care EveryWhere ID. This could be used by other organizations to access your [facility] medical records  [id number]        Your Vitals Were     Pulse Last Period Pulse Oximetry Breastfeeding? BMI (Body Mass Index)       91 10/10/2016 98% No 24.77 kg/m2        Blood Pressure from Last 3 Encounters:   09/10/18 149/83   08/16/18 102/72   08/17/17 108/76    Weight from Last 3 Encounters:   09/10/18 147 lb (66.7 kg)   08/16/18 148 lb (67.1 kg)   08/17/17 149 lb 8 oz (67.8 kg)              We Performed the Following     COLP CERVIX/UPPER VAGINA W BX CERVIX/ENDOCERV CURETT     Surgical pathology exam        Primary Care Provider Office Phone # Fax #    Christal Keith [phone]       [address]  [facility] [address]        Equal Access to Services     OTTO WEST : Hadii aad ku hadasho Soomaali, waaxda luqadaha, qaybta kaalmada adeegyada, waxay luisin hayvanesan anh palafox lamilagros . So [facility] [phone].    ATENCIÓN: Si habla español, tiene a terry disposición servicios gratuitos de asistencia lingüística. Llame al [phone].    We comply with applicable federal civil rights laws and [place] laws. We do not discriminate on the basis of race, color, national origin, age, disability, sex, sexual orientation, or gender identity.            Thank you!     Thank you for choosing [facility]  for your care. Our goal is always to provide you with excellent care. Hearing back from our patients is one way we can continue to improve our services.  Please take a few minutes to complete the written survey that you may receive in the mail after your visit with us. Thank you!             Your Updated Medication List - Protect others around you: Learn how to safely use, store and throw away your medicines at www.disposemymeds.org.          This list is accurate as of 9/10/18  3:44 PM.  Always use your most recent med list.                   Brand Name Dispense Instructions for use Diagnosis    acyclovir 5 % cream    ZOVIRAX    1 g    To affected areas    Herpes simplex without mention of complication       ADVIL 200 MG capsule   Generic drug:  ibuprofen     100    1 CAPSULE EVERY 4 TO 6 HOURS AS NEEDED    Back pain       LORazepam 0.5 MG tablet    ATIVAN    15 tablet    Take 0.5-1 tablets (0.25-0.5 mg) by mouth every 8 hours as needed for anxiety Take 30 minutes prior to departure.  Do not operate a vehicle after taking this medication    Isolated or specific phobia       PARoxetine Mesylate 7.5 MG Caps     30 capsule    Take 1 tablet by mouth At Bedtime    Menopausal syndrome (hot flashes)       * valACYclovir 1000 mg tablet    VALTREX    4 tablet    Take 2 tablets (2,000 mg) by mouth 2 times daily    Encounter for routine adult health examination without abnormal findings       * valACYclovir 1000 mg tablet    VALTREX    4 tablet    TAKE 2 TABLETS(2000 MG) BY MOUTH TWICE DAILY    Encounter for routine adult health examination without abnormal findings       zolpidem 5 MG tablet    AMBIEN    30 tablet    Take 1 tablet (5 mg) by mouth nightly as needed for sleep    Insomnia, unspecified type       * Notice:  This list has 2 medication(s) that are the same as other medications prescribed for you. Read the directions carefully, and ask your doctor or other care provider to review them with you.

## 2018-09-10 NOTE — PATIENT INSTRUCTIONS

## 2018-09-10 NOTE — NURSING NOTE
"Chief Complaint   Patient presents with     Colposcopy       Initial LMP 10/10/2016 Estimated body mass index is 24.93 kg/(m^2) as calculated from the following:    Height as of 18: 5' 4.6\" (1.641 m).    Weight as of 18: 148 lb (67.1 kg).  BP completed using cuff size: regular        The following HM Due: NONE      The following patient reported/Care Every where data was sent to:  P ABSTRACT QUALITY INITIATIVES [69896]  none      n/a              "

## 2018-09-10 NOTE — PROGRESS NOTES
INDICATION: Bridget Coyne is a 52 year old female who presents for colposcopy.  Pap smear was reported as LSIL, other HPV + (last year NIL, other HPV +).  We discussed cervical dysplasia, degrees of dysplasia, options of management.  We discussed high-grade OTTONIEL, cervical cancer, possible progression of disease into invasive cervical cancer.  We discussed HPV.  She has done considerable reading and is quite worried about the diagnosis, but after our discussion seemed reassured.        Informed consent obtained for procedure.               COLPOSCOPIC EXAM:  Cervix is fully visualized.  Squamocolumnar junction is not fully visualized.      Using standard technique and acetic acid application, the cervix and vagina were examined using the colposcope.  The transformation zone appeared abnormal, with acetowhite epithelium most prominent posterior, and representative biopsy of 6:00 was sent.  The   ECC was performed.   Monsels applied for hemostasis.    Colposcopic Impression:   Low grade.        PLAN: Post Colposcopy Instructions were given.  Call in 1 week for biopsy results.  Will advise followup depending on results.      In addition to the procedure, I spent 10 minutes with the patient, with more than 50% spent in counseling/discussing the diagnosis and treatment options.

## 2018-09-13 LAB — COPATH REPORT: NORMAL

## 2018-09-15 ENCOUNTER — HEALTH MAINTENANCE LETTER (OUTPATIENT)
Age: 53
End: 2018-09-15

## 2018-10-14 DIAGNOSIS — N95.1 MENOPAUSAL SYNDROME (HOT FLASHES): ICD-10-CM

## 2018-10-14 NOTE — TELEPHONE ENCOUNTER
Requested Prescriptions   Pending Prescriptions Disp Refills     PARoxetine Mesylate 7.5 MG CAPS [Pharmacy Med Name: PAROXETINE MESYLATE 7.5 MG CAP] 30 capsule 1     Sig: TAKE 1 CAPSULE BY MOUTH EVERYDAY AT BEDTIME    There is no refill protocol information for this order          Last Written Prescription Date:  8/16/18  Last Fill Quantity: 30,   # refills: 1  Last Office Visit: 8/16/18  Future Office visit:       Routing refill request to provider for review/approval because:  Drug not on the [facility], P or [facility] refill protocol or controlled substance

## 2018-10-15 RX ORDER — PAROXETINE 7.5 MG/1
CAPSULE ORAL
Qty: 30 CAPSULE | Refills: 1 | Status: SHIPPED | OUTPATIENT
Start: 2018-10-15 | End: 2018-12-11

## 2018-12-11 DIAGNOSIS — N95.1 MENOPAUSAL SYNDROME (HOT FLASHES): ICD-10-CM

## 2018-12-11 NOTE — TELEPHONE ENCOUNTER
PAROXETINE MESYLATE 7.5 MG CAP      Last Written Prescription Date:  10/15/2018  Last Fill Quantity: 30 capsule,   # refills: 1  Last Office Visit: 8/16/2018  Future Office visit:       Routing refill request to provider for review/approval because:  Drug not on the [facility], [facility] or [facility] refill protocol or controlled substance

## 2018-12-13 RX ORDER — PAROXETINE 7.5 MG/1
CAPSULE ORAL
Qty: 90 CAPSULE | Refills: 2 | Status: SHIPPED | OUTPATIENT
Start: 2018-12-13 | End: 2022-01-26

## 2018-12-13 NOTE — TELEPHONE ENCOUNTER
Routing refill request to provider for review/approval because:  Drug not on the [facility] refill protocol     Dr. Keith-Please sign if agree.    Thank you!  PETERSON Neal, HANKN, RN

## 2018-12-27 ENCOUNTER — E-VISIT (OUTPATIENT)
Dept: FAMILY MEDICINE | Facility: CLINIC | Age: 53
End: 2018-12-27
Payer: COMMERCIAL

## 2018-12-27 DIAGNOSIS — L72.9 SKIN CYST: Primary | ICD-10-CM

## 2018-12-27 DIAGNOSIS — Z12.31 VISIT FOR SCREENING MAMMOGRAM: ICD-10-CM

## 2018-12-27 PROCEDURE — 99207 ZZC NO BILLABLE SERVICE THIS VISIT: CPT | Performed by: FAMILY MEDICINE

## 2019-01-04 ENCOUNTER — OFFICE VISIT (OUTPATIENT)
Dept: FAMILY MEDICINE | Facility: CLINIC | Age: 54
End: 2019-01-04
Payer: COMMERCIAL

## 2019-01-04 VITALS
WEIGHT: 152 LBS | BODY MASS INDEX: 25.61 KG/M2 | RESPIRATION RATE: 18 BRPM | OXYGEN SATURATION: 95 % | HEART RATE: 70 BPM | SYSTOLIC BLOOD PRESSURE: 125 MMHG | DIASTOLIC BLOOD PRESSURE: 75 MMHG | TEMPERATURE: 97.5 F

## 2019-01-04 DIAGNOSIS — Z13.6 CARDIOVASCULAR SCREENING; LDL GOAL LESS THAN 160: ICD-10-CM

## 2019-01-04 DIAGNOSIS — Z23 NEED FOR PROPHYLACTIC VACCINATION AND INOCULATION AGAINST INFLUENZA: Primary | ICD-10-CM

## 2019-01-04 DIAGNOSIS — Z13.1 SCREENING FOR DIABETES MELLITUS: ICD-10-CM

## 2019-01-04 DIAGNOSIS — D36.9 DERMOID CYST: ICD-10-CM

## 2019-01-04 PROCEDURE — 99213 OFFICE O/P EST LOW 20 MIN: CPT | Mod: 25 | Performed by: FAMILY MEDICINE

## 2019-01-04 PROCEDURE — 82947 ASSAY GLUCOSE BLOOD QUANT: CPT | Performed by: FAMILY MEDICINE

## 2019-01-04 PROCEDURE — 90682 RIV4 VACC RECOMBINANT DNA IM: CPT | Performed by: FAMILY MEDICINE

## 2019-01-04 PROCEDURE — 80061 LIPID PANEL: CPT | Performed by: FAMILY MEDICINE

## 2019-01-04 PROCEDURE — 36415 COLL VENOUS BLD VENIPUNCTURE: CPT | Performed by: FAMILY MEDICINE

## 2019-01-04 PROCEDURE — 90471 IMMUNIZATION ADMIN: CPT | Performed by: FAMILY MEDICINE

## 2019-01-04 RX ORDER — CEPHALEXIN 500 MG/1
500 CAPSULE ORAL 3 TIMES DAILY
Qty: 54 CAPSULE | Refills: 0 | Status: SHIPPED | OUTPATIENT
Start: 2019-01-04 | End: 2019-01-22

## 2019-01-04 NOTE — PROGRESS NOTES
SUBJECTIVE:   Bridget Coyne is a 53 year old female who presents to clinic today for the following health issues:    Cyst on Abdomin      Duration: 1-2 weeks 2    Description (location/character/radiation): Under the breast bone but above the belly to the right     Intensity:  mild    Accompanying signs and symptoms: A lot of tenderness, pressure,red streaks coming from it and a red ring, warm to the couch, redness    History (similar episodes/previous evaluation): None    Precipitating or alleviating factors: None    Therapies tried and outcome: Heat patch and ibuprofen for the pain     Presents with DERMOID scar on mid central abdomen bust below xiphoid process which has become infected.  She is scheduled to see DERM on 1- but presents today for treatment of these new changes until she can be seen.  She was skiing recently and felt the movements required to ski caused new redness and irritation.  There is now redness and warmth over area of dermoid scar.  No fever or chills, no streaking.  Using hot packs to area at this time.      Problem list and histories reviewed & adjusted, as indicated.  Additional history: as documented    Patient Active Problem List   Diagnosis     Contraceptive management     Herpes simplex virus (HSV) infection     CARDIOVASCULAR SCREENING; LDL GOAL LESS THAN 160     Insomnia     Papanicolaou smear of cervix with low grade squamous intraepithelial lesion (LGSIL)     Past Surgical History:   Procedure Laterality Date     HC TOOTH EXTRACTION W/FORCEP       TONSILLECTOMY         Social History     Tobacco Use     Smoking status: Never Smoker     Smokeless tobacco: Never Used   Substance Use Topics     Alcohol use: Yes     Comment: 6 drinks per week     Family History   Problem Relation Age of Onset     Breast Cancer Paternal Grandmother         and stomach cancer     Cancer - colorectal Maternal Grandmother      Cancer Maternal Grandmother         colon cancer     Heart Disease  Maternal Grandmother         heart attack     Cancer Maternal Aunt         brain cancer     Neurologic Disorder Father         parkinson's disease     Liver Disease Father 70     Hypertension Mother      Lipids Mother      Diabetes Brother         on oral agent     Breast Cancer Other         cousin     Cancer Paternal Uncle         lung         Current Outpatient Medications   Medication Sig Dispense Refill     acyclovir (ZOVIRAX) 5 % cream To affected areas 1 g 11     ADVIL 200 MG OR CAPS 1 CAPSULE EVERY 4 TO 6 HOURS AS NEEDED 100 0     cephALEXin (KEFLEX) 500 MG capsule Take 1 capsule (500 mg) by mouth 3 times daily for 18 days 54 capsule 0     LORazepam (ATIVAN) 0.5 MG tablet Take 0.5-1 tablets (0.25-0.5 mg) by mouth every 8 hours as needed for anxiety Take 30 minutes prior to departure.  Do not operate a vehicle after taking this medication 15 tablet 0     PARoxetine Mesylate 7.5 MG CAPS TAKE 1 CAPSULE BY MOUTH EVERYDAY AT BEDTIME 90 capsule 2     valACYclovir (VALTREX) 1000 mg tablet Take 2 tablets (2,000 mg) by mouth 2 times daily 4 tablet 5     valACYclovir (VALTREX) 1000 mg tablet TAKE 2 TABLETS(2000 MG) BY MOUTH TWICE DAILY (Patient not taking: Reported on 9/10/2018) 4 tablet 0     zolpidem (AMBIEN) 5 MG tablet Take 1 tablet (5 mg) by mouth nightly as needed for sleep (Patient not taking: Reported on 9/10/2018) 30 tablet 0     Allergies   Allergen Reactions     No Known Drug Allergies      Recent Labs   Lab Test 08/17/17  0839 08/22/16  0843 08/14/14  1443   LDL 80 103*  --     101  --    TRIG 80 100  --    ALT  --   --  24   CR  --   --  0.68   GFRESTIMATED  --   --  >90  Non  GFR Calc     GFRESTBLACK  --   --  >90  African American GFR Calc     POTASSIUM  --   --  4.2      BP Readings from Last 3 Encounters:   01/04/19 125/75   09/10/18 149/83   08/16/18 102/72    Wt Readings from Last 3 Encounters:   01/04/19 68.9 kg (152 lb)   09/10/18 66.7 kg (147 lb)   08/16/18 67.1 kg (148 lb)                     Reviewed and updated as needed this visit by clinical staff  Tobacco  Allergies  Meds  Med Hx  Surg Hx  Fam Hx  Soc Hx      Reviewed and updated as needed this visit by Provider         ROS:  Constitutional, HEENT, cardiovascular, pulmonary, gi and gu systems are negative, except as otherwise noted.    OBJECTIVE:     /75 (BP Location: Right arm, Patient Position: Sitting, Cuff Size: Adult Regular)   Pulse 70   Temp 97.5  F (36.4  C) (Oral)   Resp 18   Wt 68.9 kg (152 lb)   LMP 10/10/2016   SpO2 95%   BMI 25.61 kg/m    Body mass index is 25.61 kg/m .  GENERAL: healthy, alert and no distress  EYES: Eyes grossly normal to inspection, PERRL and conjunctivae and sclerae normal  NECK: no adenopathy, no asymmetry, masses, or scars and thyroid normal to palpation  ABDOMEN: soft, nontender, no hepatosplenomegaly, no masses and bowel sounds normal  MS: no gross musculoskeletal defects noted, no edema  SKIN: dermoid cyst just below xiphoid process of mid central abdomen measuring 3 cm x 2 cm with redness and erythema over and around it, no drainage, mild tenderness to palpation.  PSYCH: mentation appears normal, affect normal/bright    ASSESSMENT/PLAN:     1. Dermoid cyst    - cephALEXin (KEFLEX) 500 MG capsule; Take 1 capsule (500 mg) by mouth 3 times daily for 18 days  Dispense: 54 capsule; Refill: 0    Will treat with keflex tid x 10 days with extra given of Abx if localized cellulitis should flare until can be seen by DERM for further treatment of dermoid scar.  Continue with HEAT and NSAIDs prn comfort.  Close Follow-up if any change or worsening prn.    2. Need for prophylactic vaccination and inoculation against influenza    - FLU VACCINE, (RIV4) RECOMBINANT HA  , IM (FluBlok, egg free) [23280]- >18 YRS ([facility] recommended  50-64 YRS)  - Vaccine Administration, Initial [52245]    Flu vaccine updated today.    Ayala Mcbride MD  [facility]    Injectable  Influenza Immunization Documentation    1.  Is the person to be vaccinated sick today?   No    2. Does the person to be vaccinated have an allergy to a component   of the vaccine?   No  Egg Allergy Algorithm Link    3. Has the person to be vaccinated ever had a serious reaction   to influenza vaccine in the past?   No    4. Has the person to be vaccinated ever had Guillain-Barré syndrome?   No    Form completed by Rmaona Moon

## 2019-01-05 LAB
CHOLEST SERPL-MCNC: 241 MG/DL
GLUCOSE SERPL-MCNC: 88 MG/DL (ref 70–99)
HDLC SERPL-MCNC: 111 MG/DL
LDLC SERPL CALC-MCNC: 115 MG/DL
NONHDLC SERPL-MCNC: 130 MG/DL
TRIGL SERPL-MCNC: 77 MG/DL

## 2019-04-26 ENCOUNTER — OFFICE VISIT (OUTPATIENT)
Dept: FAMILY MEDICINE | Facility: CLINIC | Age: 54
End: 2019-04-26
Payer: COMMERCIAL

## 2019-04-26 VITALS
SYSTOLIC BLOOD PRESSURE: 115 MMHG | OXYGEN SATURATION: 95 % | RESPIRATION RATE: 16 BRPM | TEMPERATURE: 98.8 F | HEIGHT: 64 IN | DIASTOLIC BLOOD PRESSURE: 76 MMHG | BODY MASS INDEX: 24.41 KG/M2 | WEIGHT: 143 LBS | HEART RATE: 85 BPM

## 2019-04-26 DIAGNOSIS — J02.9 VIRAL PHARYNGITIS: Primary | ICD-10-CM

## 2019-04-26 LAB
DEPRECATED S PYO AG THROAT QL EIA: NORMAL
SPECIMEN SOURCE: NORMAL

## 2019-04-26 PROCEDURE — 87081 CULTURE SCREEN ONLY: CPT | Performed by: NURSE PRACTITIONER

## 2019-04-26 PROCEDURE — 87880 STREP A ASSAY W/OPTIC: CPT | Performed by: NURSE PRACTITIONER

## 2019-04-26 PROCEDURE — 99213 OFFICE O/P EST LOW 20 MIN: CPT | Performed by: NURSE PRACTITIONER

## 2019-04-26 ASSESSMENT — MIFFLIN-ST. JEOR: SCORE: 1232.61

## 2019-04-26 NOTE — PROGRESS NOTES
SUBJECTIVE:   Bridget Coyne is a 53 year old female who presents to clinic today for the following   health issues:    Chief Complaint   Patient presents with     Pharyngitis     started wednesday,cough headache       Bridget is in the clinic today with complaints of a sore throat and URI symptoms.  Her symptoms started 2 days ago.  They are not going away with time.  She called in sick to work today.  Her throat feels like it is on fire.  She is taking fluids okay.    No fever, vomiting, diarrhea.    She did get a flu shot this year.    Ibuprofen helps but tylenol doesn't.    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      Reviewed  and updated as needed this visit by clinical staff  Tobacco  Allergies  Meds  Med Hx  Surg Hx  Fam Hx  Soc Hx        Reviewed and updated as needed this visit by Provider         Patient Active Problem List   Diagnosis     Contraceptive management     Herpes simplex virus (HSV) infection     CARDIOVASCULAR SCREENING; LDL GOAL LESS THAN 160     Insomnia     Papanicolaou smear of cervix with low grade squamous intraepithelial lesion (LGSIL)     Past Surgical History:   Procedure Laterality Date     HC TOOTH EXTRACTION W/FORCEP       TONSILLECTOMY         Social History     Tobacco Use     Smoking status: Never Smoker     Smokeless tobacco: Never Used   Substance Use Topics     Alcohol use: Yes     Comment: 6 drinks per week     Family History   Problem Relation Age of Onset     Breast Cancer Paternal Grandmother         and stomach cancer     Cancer - colorectal Maternal Grandmother      Cancer Maternal Grandmother         colon cancer     Heart Disease Maternal Grandmother         heart attack     Cancer Maternal Aunt         brain cancer     Neurologic Disorder Father         parkinson's disease     Liver Disease Father 70     Hypertension Mother      Lipids Mother      Diabetes Brother         on oral agent     Breast Cancer Other         cousin     Cancer Paternal  "Uncle         lung         Current Outpatient Medications   Medication Sig Dispense Refill     acyclovir (ZOVIRAX) 5 % cream To affected areas 1 g 11     ADVIL 200 MG OR CAPS 1 CAPSULE EVERY 4 TO 6 HOURS AS NEEDED 100 0     LORazepam (ATIVAN) 0.5 MG tablet Take 0.5-1 tablets (0.25-0.5 mg) by mouth every 8 hours as needed for anxiety Take 30 minutes prior to departure.  Do not operate a vehicle after taking this medication 15 tablet 0     PARoxetine Mesylate 7.5 MG CAPS TAKE 1 CAPSULE BY MOUTH EVERYDAY AT BEDTIME 90 capsule 2     valACYclovir (VALTREX) 1000 mg tablet TAKE 2 TABLETS(2000 MG) BY MOUTH TWICE DAILY 4 tablet 0     valACYclovir (VALTREX) 1000 mg tablet Take 2 tablets (2,000 mg) by mouth 2 times daily 4 tablet 5     zolpidem (AMBIEN) 5 MG tablet Take 1 tablet (5 mg) by mouth nightly as needed for sleep 30 tablet 0     Allergies   Allergen Reactions     No Known Drug Allergies      Recent Labs   Lab Test 01/04/19  1251 08/17/17  0839 08/22/16  0843 08/14/14  1443   * 80 103*  --     118 101  --    TRIG 77 80 100  --    ALT  --   --   --  24   CR  --   --   --  0.68   GFRESTIMATED  --   --   --  >90  Non  GFR Calc     GFRESTBLACK  --   --   --  >90  African American GFR Calc     POTASSIUM  --   --   --  4.2      BP Readings from Last 3 Encounters:   04/26/19 115/76   01/04/19 125/75   09/10/18 149/83    Wt Readings from Last 3 Encounters:   04/26/19 64.9 kg (143 lb)   01/04/19 68.9 kg (152 lb)   09/10/18 66.7 kg (147 lb)                  Labs reviewed in EPIC    ROS:  Constitutional, HEENT, cardiovascular, pulmonary, GI, , musculoskeletal, neuro, skin, endocrine and psych systems are negative, except as otherwise noted.    OBJECTIVE:     /76 (BP Location: Right arm, Patient Position: Sitting, Cuff Size: Adult Regular)   Pulse 85   Temp 98.8  F (37.1  C) (Oral)   Resp 16   Ht 1.632 m (5' 4.25\")   Wt 64.9 kg (143 lb)   LMP 10/10/2016   SpO2 95%   BMI 24.36 kg/m  "   Body mass index is 24.36 kg/m .  General: healthy, alert and mild distress  Skin is clammy.  No rashes present.  HEENT: bilateral TM normal without fluid or erythema, neck has bilateral anterior cervical nodes enlarged and pharynx erythematous without exudate.  Lungs: respirations easy and regular. chest clear to ausculation bilaterally  Heart: S1, S2 normal, no murmur, no gallop, rate regular  Psyche: in good spirits    Diagnostics:  Results for orders placed or performed in visit on 04/26/19   Strep, Rapid Screen   Result Value Ref Range    Specimen Description Throat     Rapid Strep A Screen       NEGATIVE: No Group A streptococcal antigen detected by immunoassay, await culture report.         ASSESSMENT/PLAN:     (J02.9) Viral pharyngitis  (primary encounter diagnosis)  Comment: Acute  Plan: Strep, Rapid Screen, Beta strep group A culture        I did discuss with the patient that her rapid strep test is negative today.  If the throat culture comes back positive, she will be notified and treated.  I did talk to her about a viral sore throat, the potential for other viral illnesses including influenza etc.  She does not fit the picture of influenza today but I did tell her the possibility.  For now, she will continue supportive management, rest, fluids etc.  She will be re-seen if anything takes a turn for the worse or does not get better.  She was appreciative.        ALESSANDRA Garcia [facility]

## 2019-04-26 NOTE — PATIENT INSTRUCTIONS
Patient Education     Viral Pharyngitis (Sore Throat)    You or your child have pharyngitis (sore throat). This infection is caused by a virus. It can cause throat pain that is worse when swallowing, aching all over, headache, and fever. The infection may be spread by coughing, kissing, or touching others after touching your mouth or nose. Antibiotic medicines do not work against viruses. They are not used for treating this illness.  Home care    If symptoms are severe, you or your child should rest at home. Return to work or school when you or your child feel well enough.     You or your child should drink plenty of fluids to prevent dehydration.    Use throat lozenges or numbing throat sprays to help reduce pain. Gargling with warm salt water will also help reduce throat pain. Dissolve 1/2 teaspoon of salt in 1 glass of warm water. Children can sip on juice or a popsicle. Children 5 years and older can also suck on a lollipop or hard candy.    Don t eat salty or spicy foods or give them to your child. These can be irritating to the throat.  Medicines for a child: You can give your child acetaminophen for fever, fussiness, or discomfort. In babies over 6 months of age, you may use ibuprofen instead of acetaminophen. If your child has chronic liver or kidney disease or ever had a stomach ulcer or GI bleeding, talk with your child s healthcare provider before giving these medicines. Aspirin should never be used by any child under 18 years of age who has a fever. It may cause severe liver damage.  Medicines for an adult: You may use acetaminophen or ibuprofen to control pain or fever, unless another medicine was prescribed for this. If you have chronic liver or kidney disease or ever had a stomach ulcer or GI bleeding, talk with your healthcare provider before using these medicines.  Follow-up care  Follow up with a healthcare provider or our staff if you or your child are not getting better over the next week.  When  to seek medical advice  Call your healthcare provider right away if any of these occur:    Fever as directed by your healthcare provider.  For children, seek care if:  ? Your child is of any age and has repeated fevers above 104 F (40 C).  ? Your child is younger than 2 years of age and has a fever of 100.4 F (38 C) for more than 1 day.  ? Your child is 2 years old or older and has a fever of 100.4 F (38 C) for more than 3 days.    New or worsening ear pain, sinus pain, or headache    Painful lumps in the back of neck    Stiff neck    Lymph nodes are getting larger    Can t swallow liquids, a lot of drooling, or can t open mouth wide due to throat pain    Signs of dehydration, such as very dark urine or no urine, sunken eyes, dizziness    Trouble breathing or noisy breathing    Muffled voice    New rash    Other symptoms are getting worse  Date Last Reviewed: 10/1/2017    [id number] [employer]. [address] [place] [address], [place] [address]. All rights reserved. This information is not intended as a substitute for professional medical care. Always follow your healthcare professional's instructions.

## 2019-04-27 LAB
BACTERIA SPEC CULT: NORMAL
SPECIMEN SOURCE: NORMAL

## 2019-07-22 ASSESSMENT — ENCOUNTER SYMPTOMS
ARTHRALGIAS: 1
COUGH: 0
PALPITATIONS: 0
SORE THROAT: 0
HEMATURIA: 0
EYE PAIN: 0
JOINT SWELLING: 1
DIZZINESS: 0
PARESTHESIAS: 0
DYSURIA: 0
CONSTIPATION: 0
FEVER: 0
FREQUENCY: 0
SHORTNESS OF BREATH: 0
HEADACHES: 0
BREAST MASS: 0
NAUSEA: 0
HEMATOCHEZIA: 0
HEARTBURN: 0
CHILLS: 0
MYALGIAS: 0
WEAKNESS: 0
NERVOUS/ANXIOUS: 0
DIARRHEA: 0
ABDOMINAL PAIN: 0

## 2019-07-23 ENCOUNTER — OFFICE VISIT (OUTPATIENT)
Dept: FAMILY MEDICINE | Facility: CLINIC | Age: 54
End: 2019-07-23
Payer: COMMERCIAL

## 2019-07-23 VITALS
OXYGEN SATURATION: 99 % | HEART RATE: 60 BPM | SYSTOLIC BLOOD PRESSURE: 114 MMHG | HEIGHT: 65 IN | RESPIRATION RATE: 12 BRPM | BODY MASS INDEX: 24.95 KG/M2 | TEMPERATURE: 98 F | WEIGHT: 149.75 LBS | DIASTOLIC BLOOD PRESSURE: 70 MMHG

## 2019-07-23 DIAGNOSIS — Z00.00 ROUTINE GENERAL MEDICAL EXAMINATION AT A HEALTH CARE FACILITY: Primary | ICD-10-CM

## 2019-07-23 DIAGNOSIS — R87.612 PAPANICOLAOU SMEAR OF CERVIX WITH LOW GRADE SQUAMOUS INTRAEPITHELIAL LESION (LGSIL): ICD-10-CM

## 2019-07-23 PROCEDURE — 88175 CYTOPATH C/V AUTO FLUID REDO: CPT | Performed by: FAMILY MEDICINE

## 2019-07-23 PROCEDURE — 87624 HPV HI-RISK TYP POOLED RSLT: CPT | Performed by: FAMILY MEDICINE

## 2019-07-23 PROCEDURE — 99396 PREV VISIT EST AGE 40-64: CPT | Performed by: FAMILY MEDICINE

## 2019-07-23 ASSESSMENT — ENCOUNTER SYMPTOMS
EYE PAIN: 0
JOINT SWELLING: 1
DIARRHEA: 0
HEMATOCHEZIA: 0
NERVOUS/ANXIOUS: 0
FEVER: 0
COUGH: 0
ARTHRALGIAS: 1
ABDOMINAL PAIN: 0
SHORTNESS OF BREATH: 0
MYALGIAS: 0
NAUSEA: 0
DIZZINESS: 0
SORE THROAT: 0
HEARTBURN: 0
BREAST MASS: 0
CHILLS: 0
FREQUENCY: 0
HEADACHES: 0
CONSTIPATION: 0
DYSURIA: 0
WEAKNESS: 0
PARESTHESIAS: 0
HEMATURIA: 0
PALPITATIONS: 0

## 2019-07-23 ASSESSMENT — MIFFLIN-ST. JEOR: SCORE: 1267.2

## 2019-07-23 NOTE — PROGRESS NOTES
t   SUBJECTIVE:   CC: Bridget Coyne is an 53 year old woman who presents for preventive health visit.     Healthy Habits:     Getting at least 3 servings of Calcium per day:  Yes    Bi-annual eye exam:  Yes    Dental care twice a year:  Yes    Sleep apnea or symptoms of sleep apnea:  None    Diet:  Regular (no restrictions)    Frequency of exercise:  6-7 days/week    Duration of exercise:  Greater than 60 minutes    Taking medications regularly:  Yes    Medication side effects:  None    PHQ-2 Total Score: 0    Additional concerns today:  No      Today's PHQ-2 Score:   PHQ-2 ( 1999 Pfizer) 7/22/2019   Q1: Little interest or pleasure in doing things 0   Q2: Feeling down, depressed or hopeless 0   PHQ-2 Score 0   Q1: Little interest or pleasure in doing things Not at all   Q2: Feeling down, depressed or hopeless Not at all   PHQ-2 Score 0       Abuse: Current or Past(Physical, Sexual or Emotional)- No  Do you feel safe in your environment? No    Social History     Tobacco Use     Smoking status: Never Smoker     Smokeless tobacco: Never Used   Substance Use Topics     Alcohol use: Yes     Comment: 6 drinks per week     If you drink alcohol do you typically have >3 drinks per day or >7 drinks per week? Yes       AUDIT - Alcohol Use Disorders Identification Test - Reproduced from the World Health Organization Audit 2001 (Second Edition) 7/22/2019   1.  How often do you have a drink containing alcohol? 4 or more times a week   2.  How many drinks containing alcohol do you have on a typical day when you are drinking? 3 or 4   3.  How often do you have five or more drinks on one occasion? Never   4.  How often during the last year have you found that you were not able to stop drinking once you had started? Never   5.  How often during the last year have you failed to do what was normally expected of you because of drinking? Never   6.  How often during the last year have you needed a first drink in the morning to get  yourself going after a heavy drinking session? Never   7.  How often during the last year have you had a feeling of guilt or remorse after drinking? Never   8.  How often during the last year have you been unable to remember what happened the night before because of your drinking? Less than monthly   9.  Have you or someone else been injured because of your drinking? No   10. Has a relative, friend, doctor or other health care worker been concerned about your drinking or suggested you cut down? No   TOTAL SCORE 6       Reviewed orders with patient.  Reviewed health maintenance and updated orders accordingly - Yes  BP Readings from Last 3 Encounters:   07/23/19 114/70   04/26/19 115/76   01/04/19 125/75    Wt Readings from Last 3 Encounters:   07/23/19 67.9 kg (149 lb 12 oz)   04/26/19 64.9 kg (143 lb)   01/04/19 68.9 kg (152 lb)                  Patient Active Problem List   Diagnosis     Contraceptive management     Herpes simplex virus (HSV) infection     CARDIOVASCULAR SCREENING; LDL GOAL LESS THAN 160     Insomnia     Papanicolaou smear of cervix with low grade squamous intraepithelial lesion (LGSIL)     Past Surgical History:   Procedure Laterality Date     COLONOSCOPY       HC TOOTH EXTRACTION W/FORCEP       TONSILLECTOMY         Social History     Tobacco Use     Smoking status: Never Smoker     Smokeless tobacco: Never Used   Substance Use Topics     Alcohol use: Yes     Comment: 6 drinks per week     Family History   Problem Relation Age of Onset     Breast Cancer Paternal Grandmother         and stomach cancer     Cancer - colorectal Maternal Grandmother      Cancer Maternal Grandmother         colon cancer     Heart Disease Maternal Grandmother         heart attack     Colon Cancer Maternal Grandmother      Cancer Maternal Aunt         brain cancer     Neurologic Disorder Father         parkinson's disease     Liver Disease Father 70     Hypertension Mother      Lipids Mother      Diabetes Brother          on oral agent     Breast Cancer Other         cousin     Cancer Paternal Uncle         lung     Breast Cancer Other         Great aunt         Current Outpatient Medications   Medication Sig Dispense Refill     acyclovir (ZOVIRAX) 5 % cream To affected areas 1 g 11     ADVIL 200 MG OR CAPS 1 CAPSULE EVERY 4 TO 6 HOURS AS NEEDED 100 0     LORazepam (ATIVAN) 0.5 MG tablet Take 0.5-1 tablets (0.25-0.5 mg) by mouth every 8 hours as needed for anxiety Take 30 minutes prior to departure.  Do not operate a vehicle after taking this medication 15 tablet 0     PARoxetine Mesylate 7.5 MG CAPS TAKE 1 CAPSULE BY MOUTH EVERYDAY AT BEDTIME 90 capsule 2     valACYclovir (VALTREX) 1000 mg tablet TAKE 2 TABLETS(2000 MG) BY MOUTH TWICE DAILY 4 tablet 0     valACYclovir (VALTREX) 1000 mg tablet Take 2 tablets (2,000 mg) by mouth 2 times daily 4 tablet 5     zolpidem (AMBIEN) 5 MG tablet Take 1 tablet (5 mg) by mouth nightly as needed for sleep 30 tablet 0     Allergies   Allergen Reactions     No Known Drug Allergies      Recent Labs   Lab Test 01/04/19  1251 08/17/17  0839 08/22/16  0843 08/14/14  1443   * 80 103*  --     118 101  --    TRIG 77 80 100  --    ALT  --   --   --  24   CR  --   --   --  0.68   GFRESTIMATED  --   --   --  >90  Non  GFR Calc     GFRESTBLACK  --   --   --  >90  African American GFR Calc     POTASSIUM  --   --   --  4.2        Mammogram Screening: Patient over age 50, mutual decision to screen reflected in health maintenance.    Pertinent mammograms are reviewed under the imaging tab.     PAP / HPV Latest Ref Rng & Units 8/16/2018 8/17/2017 8/14/2014   PAP - LSIL(A) NIL NIL   HPV 16 DNA NEG:Negative Negative Negative -   HPV 18 DNA NEG:Negative Negative Negative -   OTHER HR HPV NEG:Negative Positive(A) Positive(A) -     Reviewed and updated as needed this visit by clinical staff  Tobacco  Allergies  Meds         Reviewed and updated as needed this visit by Provider       "  Past Medical History:   Diagnosis Date     Abnormal Pap smear of cervix 2018    LSIL, +HR HPV, not 16/18: see problem list     Cervical high risk HPV (human papillomavirus) test positive 2017, 18    + HR HPV (not 16 or 18) result.      Herpes simplex without mention of complication      Unspecified contraceptive management       Past Surgical History:   Procedure Laterality Date     COLONOSCOPY       HC TOOTH EXTRACTION W/FORCEP       TONSILLECTOMY       OB History    Para Term  AB Living   5 2 2 0 3 2   SAB TAB Ectopic Multiple Live Births   3 0 0 0 0      # Outcome Date GA Lbr Raul/2nd Weight Sex Delivery Anes PTL Lv   5 Term            4 Term            3 SAB            2 SAB            1 SAB                Review of Systems   Constitutional: Negative for chills and fever.   HENT: Negative for congestion, ear pain, hearing loss and sore throat.    Eyes: Negative for pain and visual disturbance.   Respiratory: Negative for cough and shortness of breath.    Cardiovascular: Negative for chest pain, palpitations and peripheral edema.   Gastrointestinal: Negative for abdominal pain, constipation, diarrhea, heartburn, hematochezia and nausea.   Breasts:  Negative for tenderness, breast mass and discharge.   Genitourinary: Negative for dysuria, frequency, genital sores, hematuria, pelvic pain, urgency, vaginal bleeding and vaginal discharge.   Musculoskeletal: Positive for arthralgias and joint swelling. Negative for myalgias.   Skin: Negative for rash.   Neurological: Negative for dizziness, weakness, headaches and paresthesias.   Psychiatric/Behavioral: Negative for mood changes. The patient is not nervous/anxious.            OBJECTIVE:   /70 (BP Location: Left arm, Patient Position: Sitting, Cuff Size: Adult Regular)   Pulse 60   Temp 98  F (36.7  C) (Oral)   Resp 12   Ht 1.638 m (5' 4.5\")   Wt 67.9 kg (149 lb 12 oz)   LMP 10/10/2016   SpO2 99%   BMI 25.31 kg/m     Wt " Readings from Last 5 Encounters:   07/23/19 67.9 kg (149 lb 12 oz)   04/26/19 64.9 kg (143 lb)   01/04/19 68.9 kg (152 lb)   09/10/18 66.7 kg (147 lb)   08/16/18 67.1 kg (148 lb)      Physical Exam  GENERAL APPEARANCE: healthy, alert and no distress  EYES: Eyes grossly normal to inspection, PERRL and conjunctivae and sclerae normal  HENT: ear canals and TM's normal, nose and mouth without ulcers or lesions, oropharynx clear and oral mucous membranes moist  NECK: no adenopathy, no asymmetry, masses, or scars and thyroid normal to palpation  RESP: lungs clear to auscultation - no rales, rhonchi or wheezes  BREAST: normal without masses, tenderness or nipple discharge and no palpable axillary masses or adenopathy  CV: regular rate and rhythm, normal S1 S2, no S3 or S4, no murmur, click or rub, no peripheral edema and peripheral pulses strong  ABDOMEN: soft, nontender, no hepatosplenomegaly, no masses and bowel sounds normal   (female): normal female external genitalia, normal urethral meatus, vaginal mucosal atrophy noted, normal cervix  MS: no musculoskeletal defects are noted and gait is age appropriate without ataxia  SKIN: no suspicious lesions or rashes  NEURO: Normal strength and tone, sensory exam grossly normal, mentation intact and speech normal  PSYCH: mentation appears normal and affect normal/bright    Diagnostic Test Results:  none     ASSESSMENT/PLAN:   1. Routine general medical examination at a health care facility   healthy   - MA Screen Bilateral w/P[place]; Future  2. LSIL pap  - Pap imaged thin layer diagnostic with HPV (select HPV order below)  - HPV High Risk Types DNA Cervical      Mammogram is due in January.  Colonoscopy is due in 2021  Recommended shingrix vaccine. Discussed risks and benefits. She will check her insurance.   COUNSELING:  Reviewed preventive health counseling, as reflected in patient instructions    Estimated body mass index is 25.31 kg/m  as calculated from the following:     "Height as of this encounter: 1.638 m (5' 4.5\").    Weight as of this encounter: 67.9 kg (149 lb 12 oz).         reports that she has never smoked. She has never used smokeless tobacco.      Counseling Resources:  ATP IV Guidelines  Pooled Cohorts Equation Calculator  Breast Cancer Risk Calculator  FRAX Risk Assessment  ICSI Preventive Guidelines  Dietary Guidelines for Americans, 2010  USDA's MyPlate  ASA Prophylaxis  Lung CA Screening    Christal Keith MD, MD  [facility] [place]  "

## 2019-07-25 LAB
COPATH REPORT: NORMAL
PAP: NORMAL

## 2019-07-26 LAB
FINAL DIAGNOSIS: NORMAL
HPV HR 12 DNA CVX QL NAA+PROBE: NEGATIVE
HPV16 DNA SPEC QL NAA+PROBE: NEGATIVE
HPV18 DNA SPEC QL NAA+PROBE: NEGATIVE
SPECIMEN DESCRIPTION: NORMAL
SPECIMEN SOURCE CVX/VAG CYTO: NORMAL

## 2019-08-12 ENCOUNTER — MYC REFILL (OUTPATIENT)
Dept: FAMILY MEDICINE | Facility: CLINIC | Age: 54
End: 2019-08-12

## 2019-08-12 DIAGNOSIS — G47.00 INSOMNIA, UNSPECIFIED TYPE: ICD-10-CM

## 2019-08-12 DIAGNOSIS — F40.298 ISOLATED OR SPECIFIC PHOBIA: ICD-10-CM

## 2019-08-12 NOTE — TELEPHONE ENCOUNTER
LORazepam (ATIVAN) 0.5 MG tablet    Last Written Prescription Date:  8/29/2018  Last Fill Quantity: 15 tablet,   # refills: 0  Last Office Visit: 7/23/2019  Future Office visit:       Routing refill request to provider for review/approval because:  Drug not on the [facility], [facility] or [facility] refill protocol or controlled substance    ____________________________________________  zolpidem (AMBIEN) 5 MG tablet   Last Written Prescription Date:  8/29/2018  Last Fill Quantity: 30 tabelt,   # refills: 0  Last Office Visit:  7/23/2019  Future Office visit:       Routing refill request to provider for review/approval because:  Drug not on the [facility], [facility] or [facility] refill protocol or controlled substance

## 2019-08-14 RX ORDER — ZOLPIDEM TARTRATE 5 MG/1
5 TABLET ORAL
Qty: 30 TABLET | Refills: 0 | Status: SHIPPED | OUTPATIENT
Start: 2019-08-14 | End: 2021-08-29

## 2019-08-14 RX ORDER — LORAZEPAM 0.5 MG/1
0.25-0.5 TABLET ORAL EVERY 8 HOURS PRN
Qty: 15 TABLET | Refills: 0 | Status: SHIPPED | OUTPATIENT
Start: 2019-08-14 | End: 2021-08-29

## 2019-08-14 NOTE — TELEPHONE ENCOUNTER
Faxed script to [facility] [address]  Rx for: lorazepam 0.5mg and ambien 5mg   Start date: Aug. 14 2019  Dispense Qty: 15 (fifteen) and 30 (thirty)  Refill: 0 (zero)  Chris: no    Malia Phompedouard, CMA

## 2019-09-30 ENCOUNTER — HEALTH MAINTENANCE LETTER (OUTPATIENT)
Age: 54
End: 2019-09-30

## 2019-12-19 DIAGNOSIS — Z00.00 ROUTINE GENERAL MEDICAL EXAMINATION AT A HEALTH CARE FACILITY: ICD-10-CM

## 2020-04-08 NOTE — TELEPHONE ENCOUNTER
Faxed RX to [facility] on [address]. LVM letting pt know.       Lizzie Patel MA       verbal cues/2 person assist/nonverbal cues (demo/gestures)

## 2020-07-01 ENCOUNTER — VIRTUAL VISIT (OUTPATIENT)
Dept: FAMILY MEDICINE | Facility: OTHER | Age: 55
End: 2020-07-01

## 2020-07-01 ENCOUNTER — AMBULATORY - HEALTHEAST (OUTPATIENT)
Dept: FAMILY MEDICINE | Facility: CLINIC | Age: 55
End: 2020-07-01

## 2020-07-01 DIAGNOSIS — Z20.822 SUSPECTED COVID-19 VIRUS INFECTION: ICD-10-CM

## 2020-07-01 NOTE — PROGRESS NOTES
"Date: 2020 15:15:00  Clinician: Liu Shrestha  Clinician NPI: [id number]  Patient: Bridget Coyne  Patient : 1965  Patient Address: [address] [place] [id number]  Patient Phone: [phone]  Visit Protocol: URI  Patient Summary:  Bridget is a 54 year old ( : 1965 ) female who initiated a Visit for COVID-19 (Coronavirus) evaluation and screening. When asked the question \"Please sign me up to receive news, health information and promotions from [facility].\", Bridget responded \"Yes\".    Bridget states her symptoms started suddenly 3-4 days ago.   Her symptoms consist of a headache, a sore throat, enlarged lymph nodes, and myalgia.   Symptom details     Sore throat: Bridget reports having mild throat pain (1-3 on a 10 point pain scale), does not have exudate on her tonsils, and can swallow liquids. The lymph nodes in her neck are enlarged. A rash has not appeared on the skin since the sore throat started.     Headache: She states the headache is mild (1-3 on a 10 point pain scale).      Bridget denies having wheezing, nausea, teeth pain, ageusia, diarrhea, vomiting, rhinitis, malaise, ear pain, chills, anosmia, facial pain or pressure, fever, cough, and nasal congestion. She also denies having recent facial or sinus surgery in the past 60 days, taking antibiotic medication in the past month, and double sickening (worsening symptoms after initial improvement). She is not experiencing dyspnea.   Precipitating events  Within the past week, Bridget has not been exposed to someone with strep throat. She has not recently been exposed to someone with influenza. Bridget has been in close contact with the following high risk individuals: people with asthma, heart disease or diabetes.   Pertinent COVID-19 (Coronavirus) information  In the past 14 days, Bridget has not worked in a congregate living setting.   She does not work or volunteer as healthcare worker or a  and does not work or volunteer in a " healthcare facility.   Bridget also has not lived in a congregate living setting in the past 14 days. She does not live with a healthcare worker.   Bridget has not had a close contact with a laboratory-confirmed COVID-19 patient within 14 days of symptom onset.   Pertinent medical history  Bridget does not get yeast infections when she takes antibiotics.   Bridget does not need a return to work/school note.   Weight: 150 lbs   Bridget does not smoke or use smokeless tobacco.   Weight: 150 lbs    MEDICATIONS: No current medications, ALLERGIES: NKDA  Clinician Response:  Dear Bridget,   Your symptoms show that you may have coronavirus (COVID-19). This illness can cause fever, cough and trouble breathing. Many people get a mild case and get better on their own. Some people can get very sick.  What should I do?  We would like to test you for this virus.   1. Please call [phone] to schedule your visit. Explain that you were referred by [facility] to have a COVID-19 test. Be ready to share your [facility] visit ID number.  The following will serve as your written order for this COVID Test, ordered by me, for the indication of suspected COVID [Z20.828]: The test will be ordered in [facility], our electronic health record, after you are scheduled. It will show as ordered and authorized by Murali Corley MD.  Order: COVID-19 (Coronavirus) PCR for SYMPTOMATIC testing from [facility].      2. When it's time for your COVID test:  Stay at least 6 feet away from others. (If someone will drive you to your test, stay in the backseat, as far away from the  as you can.)   Cover your mouth and nose with a mask, tissue or washcloth.  Go straight to the testing site. Don't make any stops on the way there or back.      3.Starting now: Stay home and away from others (self-isolate) until:   You've had no fever---and no medicine that reduces fever---for 3 full days (72 hours). And...   Your other symptoms have gotten better. For example, your cough or breathing  "has improved. And...   At least 10 days have passed since your symptoms started.       During this time, don't leave the house except for testing or medical care.   Stay in your own room, even for meals. Use your own bathroom if you can.   Stay away from others in your home. No hugging, kissing or shaking hands. No visitors.  Don't go to work, school or anywhere else.    Clean \"high touch\" surfaces often (doorknobs, counters, handles, etc.). Use a household cleaning spray or wipes. You'll find a full list of  on the EPA website: www.epa.gov/pesticide-registration/list-n-disinfectants-use-against-sars-cov-2.   Cover your mouth and nose with a mask, tissue or washcloth to avoid spreading germs.  Wash your hands and face often. Use soap and water.  Caregivers in these groups are at risk for severe illness due to COVID-19:  o People 65 years and older  o People who live in a nursing home or long-term care facility  o People with chronic disease (lung, heart, cancer, diabetes, kidney, liver, immunologic)  o People who have a weakened immune system, including those who:   Are in cancer treatment  Take medicine that weakens the immune system, such as corticosteroids  Had a bone marrow or organ transplant  Have an immune deficiency  Have poorly controlled HIV or AIDS  Are obese (body mass index of 40 or higher)  Smoke regularly   o Caregivers should wear gloves while washing dishes, handling laundry and cleaning bedrooms and bathrooms.  o Use caution when washing and drying laundry: Don't shake dirty laundry, and use the warmest water setting that you can.  o For more tips, go to www.cdc.gov/coronavirus/2019-ncov/downloads/10Things.pdf.    4.Sign up for [employer]. We know it's scary to hear that you might have COVID-19. We want to track your symptoms to make sure you're okay over the next 2 weeks. Please look for an email from [employer]---this is a free, online program that we'll use to keep in touch. To sign " up, follow the link in the email. Learn more at [email or web address]  How can I take care of myself?   Get lots of rest. Drink extra fluids (unless a doctor has told you not to).   Take Tylenol (acetaminophen) for fever or pain. If you have liver or kidney problems, ask your family doctor if it's okay to take Tylenol.   Adults can take either:    650 mg (two 325 mg pills) every 4 to 6 hours, or...   1,000 mg (two 500 mg pills) every 8 hours as needed.    Note: Don't take more than 3,000 mg in one day. Acetaminophen is found in many medicines (both prescribed and over-the-counter medicines). Read all labels to be sure you don't take too much.   For children, check the Tylenol bottle for the right dose. The dose is based on the child's age or weight.    If you have other health problems (like cancer, heart failure, an organ transplant or severe kidney disease): Call your specialty clinic if you don't feel better in the next 2 days.       Know when to call 911. Emergency warning signs include:    Trouble breathing or shortness of breath Pain or pressure in the chest that doesn't go away Feeling confused like you haven't felt before, or not being able to wake up Bluish-colored lips or face.  Where can I get more information?   [facility] -- About COVID-19: [email or web address]   CDC -- What to Do If You're Sick: www.cdc.gov/coronavirus/2019-ncov/about/steps-when-sick.html   CDC -- Ending Home Isolation: www.cdc.gov/coronavirus/2019-ncov/hcp/disposition-in-home-patients.html   CDC -- Caring for Someone: www.cdc.gov/coronavirus/2019-ncov/if-you-are-sick/care-for-someone.html   [facility] -- Interim Guidance for Hospital Discharge to Home: www.health.[facility]/diseases/coronavirus/hcp/hospdischarge.pdf   [facility] clinical trials (COVID-19 research studies): clinicalaffairs.[facility]/n-clinical-trials    Below are the COVID-19 hotlines at the [place] Department of Health ([facility]).  Interpreters are available.    For health questions: Call 482-089-6520 or 1-529.737.9640 (7 a.m. to 7 p.m.) For questions about schools and childcare: Call 352-001-4669 or 1-332.535.3514 (7 a.m. to 7 p.m.)    Diagnosis: Myalgia  Diagnosis ICD: M79.1

## 2020-07-02 ENCOUNTER — AMBULATORY - HEALTHEAST (OUTPATIENT)
Dept: FAMILY MEDICINE | Facility: CLINIC | Age: 55
End: 2020-07-02

## 2020-07-02 DIAGNOSIS — Z20.822 SUSPECTED COVID-19 VIRUS INFECTION: ICD-10-CM

## 2020-07-04 ENCOUNTER — COMMUNICATION - HEALTHEAST (OUTPATIENT)
Dept: FAMILY MEDICINE | Facility: CLINIC | Age: 55
End: 2020-07-04

## 2020-07-06 ENCOUNTER — MYC MEDICAL ADVICE (OUTPATIENT)
Dept: FAMILY MEDICINE | Facility: CLINIC | Age: 55
End: 2020-07-06

## 2021-01-15 ENCOUNTER — HEALTH MAINTENANCE LETTER (OUTPATIENT)
Age: 56
End: 2021-01-15

## 2021-03-20 ENCOUNTER — HEALTH MAINTENANCE LETTER (OUTPATIENT)
Age: 56
End: 2021-03-20

## 2021-06-20 NOTE — LETTER
Letter by Vicki Bowman RN at      Author: Vicki Bowman RN Service: -- Author Type: --    Filed:  Encounter Date: 7/4/2020 Status: (Other)       7/4/2020        Bridget Coyne  [address]  [facility] [address]    This letter provides a written record that you were tested for COVID-19 on 7/2/20.     Your result was negative. This means that we didnt find the virus that causes COVID-19 in your sample. A test may show negative when you do actually have the virus. This can happen when the virus is in the early stages of infection, before you feel illness symptoms.    If you have symptoms   Stay home and away from others (self-isolate) until you meet ALL of the guidelines below:    Youve had no fever--and no medicine that reduces fever--for 3 full days (72 hours). And ?    Your other symptoms have gotten better. For example, your cough or breathing has improved. And?    At least 10 days have passed since your symptoms started.    During this time:    Stay home. Dont go to work, school or anywhere else.     Stay in your own room, including for meals. Use your own bathroom if you can.    Stay away from others in your home. No hugging, kissing or shaking hands. No visitors.    Clean high touch surfaces often (doorknobs, counters, handles, etc.). Use a household cleaning spray or wipes. You can find a full list on the EPA website at www.epa.gov/pesticide-registration/list-n-disinfectants-use-against-sars-cov-2.    Cover your mouth and nose with a mask, tissue or washcloth to avoid spreading germs.    Wash your hands and face often with soap and water.    Going back to work  Check with your employer for any guidelines to follow for going back to work.    Employers: This document serves as formal notice that your employee tested negative for COVID-19, as of the testing date shown above.

## 2021-06-30 ENCOUNTER — ANCILLARY PROCEDURE (OUTPATIENT)
Dept: MAMMOGRAPHY | Facility: CLINIC | Age: 56
End: 2021-06-30
Attending: PHYSICIAN ASSISTANT
Payer: COMMERCIAL

## 2021-06-30 DIAGNOSIS — Z12.31 VISIT FOR SCREENING MAMMOGRAM: ICD-10-CM

## 2021-06-30 PROCEDURE — 77063 BREAST TOMOSYNTHESIS BI: CPT | Mod: GC | Performed by: STUDENT IN AN ORGANIZED HEALTH CARE EDUCATION/TRAINING PROGRAM

## 2021-06-30 PROCEDURE — 77067 SCR MAMMO BI INCL CAD: CPT | Mod: GC | Performed by: STUDENT IN AN ORGANIZED HEALTH CARE EDUCATION/TRAINING PROGRAM

## 2021-08-13 ASSESSMENT — ENCOUNTER SYMPTOMS
HEADACHES: 0
PARESTHESIAS: 0
COUGH: 0
NAUSEA: 0
MYALGIAS: 1
WEAKNESS: 0
PALPITATIONS: 0
DIARRHEA: 1
EYE PAIN: 1
FEVER: 0
HEMATURIA: 0
NERVOUS/ANXIOUS: 0
DYSURIA: 0
HEMATOCHEZIA: 0
HEARTBURN: 0
JOINT SWELLING: 1
ARTHRALGIAS: 1
CONSTIPATION: 0
ABDOMINAL PAIN: 0
FREQUENCY: 0
SHORTNESS OF BREATH: 0
DIZZINESS: 0
BREAST MASS: 0
SORE THROAT: 0
CHILLS: 0

## 2021-08-16 ENCOUNTER — OFFICE VISIT (OUTPATIENT)
Dept: FAMILY MEDICINE | Facility: CLINIC | Age: 56
End: 2021-08-16
Payer: COMMERCIAL

## 2021-08-16 VITALS
HEIGHT: 64 IN | DIASTOLIC BLOOD PRESSURE: 84 MMHG | WEIGHT: 160.12 LBS | BODY MASS INDEX: 27.34 KG/M2 | SYSTOLIC BLOOD PRESSURE: 124 MMHG | OXYGEN SATURATION: 97 % | HEART RATE: 97 BPM | TEMPERATURE: 98 F

## 2021-08-16 DIAGNOSIS — Z13.1 SCREENING FOR DIABETES MELLITUS: ICD-10-CM

## 2021-08-16 DIAGNOSIS — Z13.220 LIPID SCREENING: ICD-10-CM

## 2021-08-16 DIAGNOSIS — Z12.11 SCREENING FOR COLON CANCER: ICD-10-CM

## 2021-08-16 DIAGNOSIS — Z00.00 ROUTINE GENERAL MEDICAL EXAMINATION AT A HEALTH CARE FACILITY: Primary | ICD-10-CM

## 2021-08-16 PROCEDURE — 99396 PREV VISIT EST AGE 40-64: CPT | Performed by: FAMILY MEDICINE

## 2021-08-16 ASSESSMENT — ENCOUNTER SYMPTOMS
EYE PAIN: 1
COUGH: 0
PARESTHESIAS: 0
PALPITATIONS: 0
MYALGIAS: 1
CHILLS: 0
SORE THROAT: 0
CONSTIPATION: 0
ABDOMINAL PAIN: 0
ARTHRALGIAS: 1
NAUSEA: 0
HEMATURIA: 0
SHORTNESS OF BREATH: 0
DIZZINESS: 0
HEARTBURN: 0
HEMATOCHEZIA: 0
JOINT SWELLING: 1
DYSURIA: 0
NERVOUS/ANXIOUS: 0
FEVER: 0
BREAST MASS: 0
HEADACHES: 0
FREQUENCY: 0
DIARRHEA: 1
WEAKNESS: 0

## 2021-08-16 ASSESSMENT — MIFFLIN-ST. JEOR: SCORE: 1300.27

## 2021-08-16 NOTE — PROGRESS NOTES
SUBJECTIVE:   CC: Bridget Coyne is an 55 year old woman who presents for preventive health visit.     Patient has been advised of split billing requirements and indicates understanding: Yes  Healthy Habits:     Getting at least 3 servings of Calcium per day:  Yes    Bi-annual eye exam:  Yes    Dental care twice a year:  Yes    Sleep apnea or symptoms of sleep apnea:  None    Diet:  Regular (no restrictions)    Frequency of exercise:  4-5 days/week    Duration of exercise:  Greater than 60 minutes    Taking medications regularly:  Yes    Medication side effects:  None    PHQ-2 Total Score: 0    Additional concerns today:  Yes     When is covid vaccine booster recommended she wants to know.   Working on diet and exercise. Intermittent fasting. Two meals per day. Not fasting today, but would like to have lipids and glucose checked in the future.     Today's PHQ-2 Score:   PHQ-2 ( 1999 Pfizer) 8/13/2021   Q1: Little interest or pleasure in doing things 0   Q2: Feeling down, depressed or hopeless 0   PHQ-2 Score 0   Q1: Little interest or pleasure in doing things Not at all   Q2: Feeling down, depressed or hopeless Not at all   PHQ-2 Score 0       Abuse: Current or Past (Physical, Sexual or Emotional) - No  Do you feel safe in your environment? Yes    Have you ever done Advance Care Planning? (For example, a Health Directive, POLST, or a discussion with a medical provider or your loved ones about your wishes): No, advance care planning information given to patient to review.  Patient plans to discuss their wishes with loved ones or provider.      Social History     Tobacco Use     Smoking status: Never Smoker     Smokeless tobacco: Never Used   Substance Use Topics     Alcohol use: Yes     Comment: 6 drinks per week     If you drink alcohol do you typically have >3 drinks per day or >7 drinks per week? Yes       AUDIT - Alcohol Use Disorders Identification Test - Reproduced from the World Health Organization Audit  2001 (Second Edition) 8/13/2021   1.  How often do you have a drink containing alcohol? 4 or more times a week   2.  How many drinks containing alcohol do you have on a typical day when you are drinking? 1 or 2   3.  How often do you have five or more drinks on one occasion? Less than monthly   4.  How often during the last year have you found that you were not able to stop drinking once you had started? Never   5.  How often during the last year have you failed to do what was normally expected of you because of drinking? Less than monthly   6.  How often during the last year have you needed a first drink in the morning to get yourself going after a heavy drinking session? Never   7.  How often during the last year have you had a feeling of guilt or remorse after drinking? Less than monthly   8.  How often during the last year have you been unable to remember what happened the night before because of your drinking? Less than monthly   9.  Have you or someone else been injured because of your drinking? No   10. Has a relative, friend, doctor or other health care worker been concerned about your drinking or suggested you cut down? No   TOTAL SCORE 8       Reviewed orders with patient.  Reviewed health maintenance and updated orders accordingly - Yes       Breast Cancer Screening:    FHS-7:   Breast CA Risk Assessment (FHS-7) 8/13/2021   Did any of your first-degree relatives have breast or ovarian cancer? no   Did any of your relatives have bilateral breast cancer? no   Did any man in your family have breast cancer? No   Did any woman in your family have breast and ovarian cancer? no   Did any woman in your family have breast cancer before age 50 y? No   Do you have 2 or more relatives with breast and/or ovarian cancer? Yes   Do you have 2 or more relatives with breast and/or bowel cancer? Yes        Pertinent mammograms are reviewed under the imaging tab.    History of abnormal Pap smear: YES    PAP / HPV Latest Ref  Rng & Units 7/23/2019 8/16/2018 8/17/2017   PAP (Historical) - NIL LSIL(A) NIL   HPV16 NEG:Negative Negative Negative Negative   HPV18 NEG:Negative Negative Negative Negative   HRHPV NEG:Negative Negative Positive(A) Positive(A)     Reviewed and updated as needed this visit by clinical staff  Tobacco   Meds              Reviewed and updated as needed this visit by Provider                Past Medical History:   Diagnosis Date     Abnormal Pap smear of cervix 08/16/2018    LSIL, +HR HPV, not 16/18: see problem list     Cervical high risk HPV (human papillomavirus) test positive 08/17/2017, 8/16/18    + HR HPV (not 16 or 18) result.      Herpes simplex without mention of complication      Unspecified contraceptive management       Past Surgical History:   Procedure Laterality Date     COLONOSCOPY       HC TOOTH EXTRACTION W/FORCEP       TONSILLECTOMY         Review of Systems   Constitutional: Negative for chills and fever.   HENT: Positive for hearing loss. Negative for congestion, ear pain and sore throat.    Eyes: Positive for pain. Negative for visual disturbance.   Respiratory: Negative for cough and shortness of breath.    Cardiovascular: Negative for chest pain, palpitations and peripheral edema.   Gastrointestinal: Positive for diarrhea. Negative for abdominal pain, constipation, heartburn, hematochezia and nausea.   Breasts:  Negative for tenderness, breast mass and discharge.   Genitourinary: Positive for vaginal discharge. Negative for dysuria, frequency, genital sores, hematuria, pelvic pain, urgency and vaginal bleeding.   Musculoskeletal: Positive for arthralgias, joint swelling and myalgias.   Skin: Negative for rash.   Neurological: Negative for dizziness, weakness, headaches and paresthesias.   Psychiatric/Behavioral: Negative for mood changes. The patient is not nervous/anxious.            OBJECTIVE:   /84 (BP Location: Left arm, Patient Position: Sitting, Cuff Size: Adult Regular)   Pulse  "97   Temp 98  F (36.7  C) (Oral)   Ht 1.632 m (5' 4.25\")   Wt 72.6 kg (160 lb 1.9 oz)   LMP 10/10/2016   SpO2 97%   BMI 27.27 kg/m    Physical Exam  GENERAL APPEARANCE: healthy, alert and no distress  EYES: Eyes grossly normal to inspection, PERRL and conjunctivae and sclerae normal  HENT: ear canals and TM's normal, nose and mouth without ulcers or lesions, oropharynx clear and oral mucous membranes moist  NECK: no adenopathy, no asymmetry, masses, or scars and thyroid normal to palpation  RESP: lungs clear to auscultation - no rales, rhonchi or wheezes  CV: regular rate and rhythm, normal S1 S2, no S3 or S4, no murmur, click or rub, no peripheral edema and peripheral pulses strong  ABDOMEN: soft, nontender, no hepatosplenomegaly, no masses and bowel sounds normal  MS: no musculoskeletal defects are noted and gait is age appropriate without ataxia  SKIN: no suspicious lesions or rashes  NEURO: Normal strength and tone, sensory exam grossly normal, mentation intact and speech normal  PSYCH: mentation appears normal and affect normal/bright    Diagnostic Test Results:  Labs reviewed in Epic    ASSESSMENT/PLAN:       ICD-10-CM    1. Routine general medical examination at a health care facility  Z00.00    2. Screening for colon cancer  Z12.11 Adult Gastro Ref - Procedure Only   3. Lipid screening  Z13.220 Lipid panel reflex to direct LDL Fasting   4. Screening for diabetes mellitus  Z13.1 Glucose     Colonoscopy is due this year. History of polyps last colonoscopy  Mammogram completed 6/30/2021  Pap with HPV cotesting completed 7/2019 and due in 2022  She has received both doses of the Moderna COVID-19 vaccine.    She says she received both doses of the Shingrix vaccine at [facility] on [address] (only one documented, but she is sure she had two)        COUNSELING:  Reviewed preventive health counseling, as reflected in patient instructions    Estimated body mass index is 27.27 kg/m  as calculated from the following:    " "Height as of this encounter: 1.632 m (5' 4.25\").    Weight as of this encounter: 72.6 kg (160 lb 1.9 oz).    Weight management plan: diet and exercise    She reports that she has never smoked. She has never used smokeless tobacco.      Counseling Resources:  ATP IV Guidelines  Pooled Cohorts Equation Calculator  Breast Cancer Risk Calculator  BRCA-Related Cancer Risk Assessment: FHS-7 Tool  FRAX Risk Assessment  ICSI Preventive Guidelines  Dietary Guidelines for Americans, 2010  USDA's MyPlate  ASA Prophylaxis  Lung CA Screening    Christal Keith MD   [facility]  "

## 2021-08-16 NOTE — PATIENT INSTRUCTIONS
Preventive Health Recommendations  Female Ages 50 - 64    Yearly exam: See your health care provider every year in order to  o Review health changes.   o Discuss preventive care.    o Review your medicines if your doctor has prescribed any.      Get a Pap test every three years (unless you have an abnormal result and your provider advises testing more often).    If you get Pap tests with HPV test, you only need to test every 5 years, unless you have an abnormal result.     You do not need a Pap test if your uterus was removed (hysterectomy) and you have not had cancer.    You should be tested each year for STDs (sexually transmitted diseases) if you're at risk.     Have a mammogram every 1 to 2 years.    Have a colonoscopy at age 50, or have a yearly FIT test (stool test). These exams screen for colon cancer.      Have a cholesterol test every 5 years, or more often if advised.    Have a diabetes test (fasting glucose) every three years. If you are at risk for diabetes, you should have this test more often.     If you are at risk for osteoporosis (brittle bone disease), think about having a bone density scan (DEXA).    Shots: Get a flu shot each year. Get a tetanus shot every 10 years.    Nutrition:     Eat at least 5 servings of fruits and vegetables each day.    Eat whole-grain bread, whole-wheat pasta and brown rice instead of white grains and rice.    Get adequate Calcium and Vitamin D.     Lifestyle    Exercise at least 150 minutes a week (30 minutes a day, 5 days a week). This will help you control your weight and prevent disease.    Limit alcohol to one drink per day.    No smoking.     Wear sunscreen to prevent skin cancer.     See your dentist every six months for an exam and cleaning.    See your eye doctor every 1 to 2 years.    CALCIUM    Recommendations:  Teenagers and premenopausal women: 1200 mg/day  Pregnant and Lactating women: 1500 mg/day  Men and Postmenopausal women on estrogen:  1200mg/day  Postmenopausal women not on estrogen: 1500 mg/day    If you are not eating dairy products you also need 800-1000 IU of vitamin D per day which can be obtained in either a multivitamin or in some of the Calcium tablets.    Dietary sources: These also contain vitamin D  Milk                            8 oz            300 mg  Yogurt                          1 cup           400 mg  Hard cheese                     1.5 oz          300 mg  Cottage cheese                  2 cup           300 mg  Orange juice with Calcium       8 oz            300 mg  Low fat dairy sources are recommended    Supplements:  Tums EX                         300 mg  Tums Ultra                      400 mg  Caltrate 600                    600 mg  Oscal                           500 mg  Oscal/D                         500 mg plus vitamin D  Women's Formula Multivitamin    450 mg

## 2021-08-29 ENCOUNTER — MYC REFILL (OUTPATIENT)
Dept: FAMILY MEDICINE | Facility: CLINIC | Age: 56
End: 2021-08-29

## 2021-08-29 DIAGNOSIS — G47.00 INSOMNIA, UNSPECIFIED TYPE: ICD-10-CM

## 2021-08-29 DIAGNOSIS — F40.298 ISOLATED OR SPECIFIC PHOBIA: ICD-10-CM

## 2021-08-29 DIAGNOSIS — Z00.00 ENCOUNTER FOR ROUTINE ADULT HEALTH EXAMINATION WITHOUT ABNORMAL FINDINGS: ICD-10-CM

## 2021-08-31 NOTE — TELEPHONE ENCOUNTER
Routing refill request to provider for review/approval because:  Drug not on the [facility] refill protocol     Last filled: 8/14/2019 #30 no refills    Last visit. 8/16/2021    Thank you

## 2021-08-31 NOTE — TELEPHONE ENCOUNTER
Routing refill request to provider for review/approval because:  Drug not on the [facility] refill protocol     Last filled: 8/14/2019 #15 no refills    Last visit: 8/16/2021    Thank you

## 2021-08-31 NOTE — TELEPHONE ENCOUNTER
Routing refill request to provider for review/approval because:  Does not pass nurse protocol    Last filled: 8/16/2021    Last visit: 8/27/20  #4   Thank you

## 2021-09-01 RX ORDER — ZOLPIDEM TARTRATE 5 MG/1
5 TABLET ORAL
Qty: 30 TABLET | Refills: 0 | Status: SHIPPED | OUTPATIENT
Start: 2021-09-01 | End: 2021-11-03

## 2021-09-01 RX ORDER — LORAZEPAM 0.5 MG/1
0.25-0.5 TABLET ORAL EVERY 8 HOURS PRN
Qty: 15 TABLET | Refills: 0 | Status: SHIPPED | OUTPATIENT
Start: 2021-09-01 | End: 2022-06-20

## 2021-09-01 RX ORDER — VALACYCLOVIR HYDROCHLORIDE 1 G/1
2000 TABLET, FILM COATED ORAL 2 TIMES DAILY
Qty: 4 TABLET | Refills: 4 | Status: SHIPPED | OUTPATIENT
Start: 2021-09-01 | End: 2022-01-26

## 2021-10-24 ENCOUNTER — HEALTH MAINTENANCE LETTER (OUTPATIENT)
Age: 56
End: 2021-10-24

## 2021-11-03 ENCOUNTER — MYC MEDICAL ADVICE (OUTPATIENT)
Dept: FAMILY MEDICINE | Facility: CLINIC | Age: 56
End: 2021-11-03

## 2021-11-03 ENCOUNTER — MYC REFILL (OUTPATIENT)
Dept: FAMILY MEDICINE | Facility: CLINIC | Age: 56
End: 2021-11-03
Payer: COMMERCIAL

## 2021-11-03 DIAGNOSIS — G47.00 INSOMNIA, UNSPECIFIED TYPE: ICD-10-CM

## 2021-11-04 NOTE — TELEPHONE ENCOUNTER
Writer responded via DoYouRemember.    HANK HoganN, RN  [facility]       Pubic ramus fracture Acute kidney injury Acute kidney injury Acute kidney injury Acute kidney injury Acute kidney injury Acute kidney injury Acute kidney injury Acute kidney injury Pubic ramus fracture Pubic ramus fracture Pubic ramus fracture Pubic ramus fracture Pubic ramus fracture Pubic ramus fracture Pubic ramus fracture Pubic ramus fracture Pubic ramus fracture Acute kidney injury

## 2021-11-05 NOTE — TELEPHONE ENCOUNTER
Routing refill request to provider for review/approval because:  Drug not on the [facility] refill protocol     Last visit: 8/16/21    HANK OwensN RN  [facility]

## 2021-11-08 RX ORDER — ZOLPIDEM TARTRATE 5 MG/1
5 TABLET ORAL
Qty: 30 TABLET | Refills: 0 | Status: SHIPPED | OUTPATIENT
Start: 2021-11-08 | End: 2022-02-23

## 2022-01-24 ENCOUNTER — MYC MEDICAL ADVICE (OUTPATIENT)
Dept: FAMILY MEDICINE | Facility: CLINIC | Age: 57
End: 2022-01-24
Payer: COMMERCIAL

## 2022-01-26 ENCOUNTER — OFFICE VISIT (OUTPATIENT)
Dept: FAMILY MEDICINE | Facility: CLINIC | Age: 57
End: 2022-01-26
Payer: COMMERCIAL

## 2022-01-26 VITALS
OXYGEN SATURATION: 97 % | HEIGHT: 66 IN | TEMPERATURE: 97.4 F | BODY MASS INDEX: 26.36 KG/M2 | RESPIRATION RATE: 15 BRPM | SYSTOLIC BLOOD PRESSURE: 114 MMHG | HEART RATE: 81 BPM | DIASTOLIC BLOOD PRESSURE: 77 MMHG | WEIGHT: 164 LBS

## 2022-01-26 DIAGNOSIS — M79.673 HEEL PAIN, UNSPECIFIED LATERALITY: Primary | ICD-10-CM

## 2022-01-26 DIAGNOSIS — B00.9 HERPES SIMPLEX VIRUS (HSV) INFECTION: ICD-10-CM

## 2022-01-26 DIAGNOSIS — Z86.0100 HISTORY OF COLONIC POLYPS: ICD-10-CM

## 2022-01-26 DIAGNOSIS — Z13.1 SCREENING FOR DIABETES MELLITUS: ICD-10-CM

## 2022-01-26 DIAGNOSIS — Z13.220 LIPID SCREENING: ICD-10-CM

## 2022-01-26 PROCEDURE — 36415 COLL VENOUS BLD VENIPUNCTURE: CPT | Performed by: FAMILY MEDICINE

## 2022-01-26 PROCEDURE — 80061 LIPID PANEL: CPT | Performed by: FAMILY MEDICINE

## 2022-01-26 PROCEDURE — 99213 OFFICE O/P EST LOW 20 MIN: CPT | Performed by: FAMILY MEDICINE

## 2022-01-26 PROCEDURE — 82947 ASSAY GLUCOSE BLOOD QUANT: CPT | Performed by: FAMILY MEDICINE

## 2022-01-26 RX ORDER — ACYCLOVIR 50 MG/G
CREAM TOPICAL
Qty: 1 G | Refills: 11 | Status: SHIPPED | OUTPATIENT
Start: 2022-01-26 | End: 2023-04-24

## 2022-01-26 RX ORDER — VALACYCLOVIR HYDROCHLORIDE 1 G/1
2000 TABLET, FILM COATED ORAL 2 TIMES DAILY
Qty: 4 TABLET | Refills: 11 | Status: SHIPPED | OUTPATIENT
Start: 2022-01-26 | End: 2023-04-24

## 2022-01-26 ASSESSMENT — MIFFLIN-ST. JEOR: SCORE: 1336.68

## 2022-01-26 NOTE — PROGRESS NOTES
Assessment & Plan     Heel pain, unspecified laterality  Suspect plantar fasciitis  Reviewed pt instructions below, referral to PT and podiatry given to use as needed.  Encouraged her to get a night splint which can help with symptoms as well.  Recommended wearing shoes with good arch support at all times when she is up on her feet.  - Orthopedic  Referral  - [facility]erral      Herpes simplex virus (HSV) infection  Refills given today for episodic treatment.  Uses acyclovir cream periodically.  At other times uses the Valtrex.  - acyclovir (ZOVIRAX) 5 % external cream  Dispense: 1 g; Refill: 11  - valACYclovir (VALTREX) 1000 mg tablet  Dispense: 4 tablet; Refill: 11    Lipid screening  She is fasting for screening test today.  - Lipid panel reflex to direct LDL Fasting    Screening for diabetes mellitus    - Glucose    History of colon poylp  Last colonoscopy was 2016. Due for repeat     gave a referral for her to  to schedule.     She will schedule a preventive visit this summer and will complete her Pap test at that time.       Patient Instructions          Patient Education     Plantar Fasciitis  Plantar fasciitis is a painful swelling of the plantar fascia. The plantar fascia is a thick, fibrous layer of tissue that covers the bones on the bottom of your foot. It supports the foot bones in an arched position.  Plantar fasciitis can happen gradually or suddenly. It usually affects one foot at a time. Heel pain can be sharp, like a knife sticking into the bottom of your foot. You may feel pain after exercising, long-distance jogging, stair climbing, long periods of standing, or after standing up.  Risk factors include: non-active lifestyle, arthritis, diabetes, obesity or recent weight gain, flat foot, high arch. Wearing high heels, loose shoes, or shoes with poor arch support for long periods of time adds to the risk. This problem is commonly found in runners and dancers. It also found in  people who stand on hard surfaces for long periods of time.  Foot pain from this condition is usually worse in the morning. But it often improves with walking. By the end of the day there may be a dull aching. Treatment requires short-term rest and controlling swelling. It may take up to 9 months before all symptoms go away. Rarely, a steroid injection into the foot, or surgery, may be needed.  Home care    If you are overweight, lose weight to help healing.    Choose supportive shoes with good arch support and shock absorbency. Replace athletic shoes when they become worn out. Don t walk or run barefoot.    Premade or custom-fitted shoe inserts may be helpful. Inserts made of silicone seem to be the most effective. Custom-made inserts can be provided by foot specialist, physical therapist, or orthopedist.    Premade or custom-made night splints keep the heel stretched out while you sleep. They may prevent morning pain.    Limit activities that stress the feet: jogging, prolonged standing or walking, contact sports, etc.    First thing in the morning and before sports, stretch the bottom of your feet. Gently flex your ankle so the toes move toward your knee.    Icing may help control heel pain. Apply an ice pack to the heel for 10 to 20 minutes as a preventive. Or ice your heel after a severe flare-up of symptoms. You may repeat this every 1 to 2 hours as needed.    You may use over-the-counter pain medicine to control pain, unless another medicine was prescribed. Anti-inflammatory pain medicines, such as ibuprofen or naproxen, may work better than acetaminophen. If you have chronic liver or kidney disease or ever had a stomach ulcer or gastrointestinal bleeding, talk with your healthcare provider before using these medicines.  Follow-up care  Follow up with your healthcare provider, or as advised.  Call for an appointment if pain worsens or there is no relief after a few weeks of home treatment. Shoe inserts, a  night splint, or a special boot may be required.  If X-rays were taken, you will be told of any new findings that may affect your care.  When to seek medical advice  Call your healthcare provider right away if any of these occur:    Foot swelling    Redness or warmth with increasing pain  [place] last reviewed this educational content on 5/1/2018 2000-2021 [employer]. All rights reserved. This information is not intended as a substitute for professional medical care. Always follow your healthcare professional's instructions.               No follow-ups on file.    Christal Keith MD  [facility]    Ghislaine Anderson is a 56 year old who presents for the following health issues      HPI      Medication Followup of     valACYclovir (VALTREX) 1000 mg tablet  acyclovir (ZOVIRAX) 5 % cream      Taking Medication as prescribed: yes    Side Effects:  None    Medication Helping Symptoms:  yes     Musculoskeletal problem/pain      Duration: 5 days    Description  Location: left foot     Intensity:  moderate    Accompanying signs and symptoms: localized- the arch of the foot    History  Previous similar problem: no   Previous evaluation:  none    Precipitating or alleviating factors:  Trauma or overuse: YES- skiing and running around   Aggravating factors include: applying pressure to the foot     Therapies tried and outcome: rest/inactivity and ice     Pain was initially worse, but has improved.  Feels stiff and more painful when she first steps on her foot in the mornings.  Gets a little better as she walks.  Does feel she is in altering her gait because of the pain.  Has not been wearing good arch support at all times.  Thinks this may be related to recently increasing her activity skate skiing.  Has been trying ice which has helped.    Review of Systems          Objective    /77 (BP Location: Left arm, Patient Position: Chair, Cuff Size: Adult Regular)   Pulse 81    "Temp 97.4  F (36.3  C) (Tympanic)   Resp 15   Ht 1.67 m (5' 5.75\")   Wt 74.4 kg (164 lb)   LMP 10/10/2016   SpO2 97%   BMI 26.67 kg/m    Body mass index is 26.67 kg/m .  Physical Exam   GENERAL: healthy, alert and no distress  Foot exam: left foot with tenderness at the plantar fascia insertion at the heel and plantar medial arch. Antalgic gait. No bruising, swelling or deformity. Normal ROM of the ankle, foot and toes.                  "

## 2022-01-26 NOTE — PATIENT INSTRUCTIONS
Patient Education     Plantar Fasciitis  Plantar fasciitis is a painful swelling of the plantar fascia. The plantar fascia is a thick, fibrous layer of tissue that covers the bones on the bottom of your foot. It supports the foot bones in an arched position.  Plantar fasciitis can happen gradually or suddenly. It usually affects one foot at a time. Heel pain can be sharp, like a knife sticking into the bottom of your foot. You may feel pain after exercising, long-distance jogging, stair climbing, long periods of standing, or after standing up.  Risk factors include: non-active lifestyle, arthritis, diabetes, obesity or recent weight gain, flat foot, high arch. Wearing high heels, loose shoes, or shoes with poor arch support for long periods of time adds to the risk. This problem is commonly found in runners and dancers. It also found in people who stand on hard surfaces for long periods of time.  Foot pain from this condition is usually worse in the morning. But it often improves with walking. By the end of the day there may be a dull aching. Treatment requires short-term rest and controlling swelling. It may take up to 9 months before all symptoms go away. Rarely, a steroid injection into the foot, or surgery, may be needed.  Home care    If you are overweight, lose weight to help healing.    Choose supportive shoes with good arch support and shock absorbency. Replace athletic shoes when they become worn out. Don t walk or run barefoot.    Premade or custom-fitted shoe inserts may be helpful. Inserts made of silicone seem to be the most effective. Custom-made inserts can be provided by foot specialist, physical therapist, or orthopedist.    Premade or custom-made night splints keep the heel stretched out while you sleep. They may prevent morning pain.    Limit activities that stress the feet: jogging, prolonged standing or walking, contact sports, etc.    First thing in the morning and before sports, stretch  the bottom of your feet. Gently flex your ankle so the toes move toward your knee.    Icing may help control heel pain. Apply an ice pack to the heel for 10 to 20 minutes as a preventive. Or ice your heel after a severe flare-up of symptoms. You may repeat this every 1 to 2 hours as needed.    You may use over-the-counter pain medicine to control pain, unless another medicine was prescribed. Anti-inflammatory pain medicines, such as ibuprofen or naproxen, may work better than acetaminophen. If you have chronic liver or kidney disease or ever had a stomach ulcer or gastrointestinal bleeding, talk with your healthcare provider before using these medicines.  Follow-up care  Follow up with your healthcare provider, or as advised.  Call for an appointment if pain worsens or there is no relief after a few weeks of home treatment. Shoe inserts, a night splint, or a special boot may be required.  If X-rays were taken, you will be told of any new findings that may affect your care.  When to seek medical advice  Call your healthcare provider right away if any of these occur:    Foot swelling    Redness or warmth with increasing pain  T[place] last reviewed this educational content on 5/1/2018 2000-2021 [employer]. All rights reserved. This information is not intended as a substitute for professional medical care. Always follow your healthcare professional's instructions.

## 2022-01-27 LAB
CHOLEST SERPL-MCNC: 241 MG/DL
FASTING STATUS PATIENT QL REPORTED: ABNORMAL
FASTING STATUS PATIENT QL REPORTED: NORMAL
GLUCOSE BLD-MCNC: 91 MG/DL (ref 70–99)
HDLC SERPL-MCNC: 91 MG/DL
LDLC SERPL CALC-MCNC: 128 MG/DL
NONHDLC SERPL-MCNC: 150 MG/DL
TRIGL SERPL-MCNC: 109 MG/DL

## 2022-01-27 NOTE — TELEPHONE ENCOUNTER
Patient evaluated in office visit on 1/26/22 with Dr. Keith.    See other 1/24/22 MyChart encounter regarding Valtrex refill.    HANK HoganN, RN  [facility]

## 2022-01-27 NOTE — TELEPHONE ENCOUNTER
Noted.    No further action needed from triage.  HANK HoganN, RN  [facility]

## 2022-01-31 NOTE — RESULT ENCOUNTER NOTE
The results of your recent lipid (cholesterol) profile were in reasonable range.    Here are the results:  Lab Results       Component                Value               Date                       CHOL                     241                 01/26/2022                 CHOL                     241                 01/04/2019            Lab Results       Component                Value               Date                       HDL                      91                  01/26/2022                 HDL                      111                 01/04/2019            Lab Results       Component                Value               Date                       LDL                      128                 01/26/2022                 LDL                      115                 01/04/2019            Lab Results       Component                Value               Date                       TRIG                     109                 01/26/2022                 TRIG                     77                  01/04/2019            Lab Results       Component                Value               Date                       CHOLHDLRATIO             2.2                 08/11/2010              Desired or goal levels are:  CHOLESTEROL: Desirable is less than 200.   HDL (Good Cholesterol): Desirable is greater than 40 (for men) greater than 50 (for women).  LDL (Bad Cholesterol): Desirable is less than 130 (or less than 100 if you have heart disease or diabetes). Borderline 130-160.  TRIGLYCERIDES: Desirable is less than 150.  Borderline is 150-200.      As you may know, an elevated cholesterol is one factor that increases your risk for heart disease and stroke. You can improve your cholesterol by controlling the amount and type of fat you eat and by increasing your daily activity level.    Here are some ways to improve your nutrition:  Eat less fat (especially butter, Crisco and other saturated fats)  Buy lean cuts of meat, reduce your portions of  red meat or substitute poultry or fish  Use skim milk and low-fat dairy products  Eat no more than 4 egg yolks per week  Avoid fried or fast foods that are high in fat  Eat more fruits and vegetables      Also consider starting or increasing your aerobic activity. Aerobic activity is the best way to improve HDL (good) cholesterol. If this would be new to you, please talk with me first about what activities are safe for you.      Other lab results:    Your glucose (blood sugar) was normal.     Please feel free to contact us with any questions or if you would like more information.    Christal Keith M.D.

## 2022-02-19 DIAGNOSIS — G47.00 INSOMNIA, UNSPECIFIED TYPE: ICD-10-CM

## 2022-02-21 NOTE — TELEPHONE ENCOUNTER
Routing refill request to provider for review/approval because:  Drug not on the [facility] refill protocol       Last Written Prescription Date:  11/8/21  Last Fill Quantity: 30,  # refills: 0   Last office visit: 1/26/2022 with prescribing provider:     Future Office Visit:    Lina Muhammad RN  [facility]

## 2022-02-23 RX ORDER — ZOLPIDEM TARTRATE 5 MG/1
TABLET ORAL
Qty: 30 TABLET | Refills: 5 | Status: SHIPPED | OUTPATIENT
Start: 2022-02-23 | End: 2022-06-20

## 2022-03-02 ENCOUNTER — OFFICE VISIT (OUTPATIENT)
Dept: ORTHOPEDICS | Facility: CLINIC | Age: 57
End: 2022-03-02
Attending: FAMILY MEDICINE
Payer: COMMERCIAL

## 2022-03-02 DIAGNOSIS — M72.2 PLANTAR FASCIITIS: ICD-10-CM

## 2022-03-02 DIAGNOSIS — M79.672 PAIN OF LEFT HEEL: Primary | ICD-10-CM

## 2022-03-02 PROCEDURE — 99205 OFFICE O/P NEW HI 60 MIN: CPT | Performed by: PODIATRIST

## 2022-03-02 NOTE — PROGRESS NOTES
Date of Service: 3/2/2022    Chief Complaint   Patient presents with     Consult     Plantar fasciitis, left foot.           HPI: Bridget is a 56 year old female who presents today for further evaluation of left heel pain.  Nature: sharp/dull    Location: left plantar heel    Duration: 1 month    Onset: The pain started after she had an abrupt slide while she was in her winter boot.    Course: Improving.    Aggravating/alleviating factors: + post-static dyskinesia.     Previous Treatments:RICE        Review of Systems: Answers for HPI/ROS submitted by the patient on 3/2/2022  General Symptoms: No  Skin Symptoms: No  HENT Symptoms: No  EYE SYMPTOMS: No  HEART SYMPTOMS: No  LUNG SYMPTOMS: No  INTESTINAL SYMPTOMS: No  URINARY SYMPTOMS: No  GYNECOLOGIC SYMPTOMS: No  BREAST SYMPTOMS: No  SKELETAL SYMPTOMS: No  BLOOD SYMPTOMS: No  NERVOUS SYSTEM SYMPTOMS: No  MENTAL HEALTH SYMPTOMS: No        PMH:   Past Medical History:   Diagnosis Date     Abnormal Pap smear of cervix 08/16/2018    LSIL, +HR HPV, not 16/18: see problem list     Cervical high risk HPV (human papillomavirus) test positive 08/17/2017, 8/16/18    + HR HPV (not 16 or 18) result.      Herpes simplex without mention of complication      Unspecified contraceptive management        PSxH:   Past Surgical History:   Procedure Laterality Date     COLONOSCOPY       HC TOOTH EXTRACTION W/FORCEP       TONSILLECTOMY         Allergies: No known drug allergies    SH:   Social History     Socioeconomic History     Marital status:      Spouse name: Bill     Number of children: 2     Years of education: 16     Highest education level: Not on file   Occupational History     Occupation: Teacher     Employer: [employer]     Comment: [employer]   Tobacco Use     Smoking status: Never Smoker     Smokeless tobacco: Never Used   Substance and Sexual Activity     Alcohol use: Yes     Comment: 6 drinks per week     Drug use: No     Sexual activity: Yes      Partners: Male     Birth control/protection: Post-menopausal   Other Topics Concern     Parent/sibling w/ CABG, MI or angioplasty before 65F 55M? No   Social History Narrative    2010    Balanced Diet - Yes    Osteoporosis Prevention Measures - Dairy servings per day: 3-4 servings daily    Regular Exercise -  Yes Describe aerobics, run, skii, and weight training    Dental Exam - YES - Date: 12-09    Eye Exam - NO    Self Breast Exam - occassionally    Abuse: Current or Past (Physical, Sexual or Emotional)- No    Do you feel safe in your environment - Yes    Guns stored in the home - No    Sunscreen used - Yes    Seatbelts used - Yes    Lipids -  YES - Date: 10-3-08    Glucose -  YES - Date: 10-3-08    Colon Cancer Screening - No    Hemoccults - NO    Pap Test -  YES - Date: 10-3-08    Do you have any concerns about STD's -  No    Mammography - YES - Date: 2-5-09    DEXA - NO    Immunizations reviewed and up to date - Yes, last TD was 3-6-03    Magy Gonzalez MA                     Social Determinants of Health     Financial Resource Strain: Not on file   Food Insecurity: Not on file   Transportation Needs: Not on file   Physical Activity: Not on file   Stress: Not on file   Social Connections: Not on file   Intimate Partner Violence: Not on file   Housing Stability: Not on file       FH:   Family History   Problem Relation Age of Onset     Hypertension Mother      Lipids Mother      Neurologic Disorder Father         parkinson's disease     Liver Disease Father 70     Diabetes Brother         on oral agent     Heart Disease Maternal Grandmother         heart attack     Colon Cancer Maternal Grandmother      Breast Cancer Paternal Grandmother         and stomach cancer     Cancer Maternal Aunt         brain cancer     Cancer Paternal Uncle         lung     Breast Cancer Other         cousin     Breast Cancer Other         Great aunt     Breast Cancer Cousin        Objective:  Data Unavailable Data Unavailable  Data Unavailable Data Unavailable Data Unavailable 0 lbs 0 oz    PT and DP pulses are 2/4 bilaterally. CRT is instant positive.  Positive pedal hair.   Gross sensation is intact bilaterally.   Equinus is noted bilaterally. No pain with active or passive ROM of the ankle, MTJ, 1st ray, or halluces bilaterally.  Pain noted today with palpation of the plantar attachment of the plantar fascia on the calcaneus on the left side.  No pain on the lateral side.  No pain in the medial band of the plantar fascia.  No pain along the course of the PT, peroneal, Achilles tendons bilaterally.  No pain with lateral calcaneal squeeze.  Nails normal bilaterally. No open lesions are noted.     Assessment: Monica is a 56-year-old with pain in the left heel.  The pain has been improving over the last few weeks.  I recommend that because this, we can start with conservative options.  I recommend that she get a night splint.  We did dispense this to her today.  I also recommended that she start some stretching exercises and then do some formal physical therapy.  She relates that she can do this.  I discussed with her we also have room to escalate her treatment, including cam boot and injection therapy, if needed.    Plan:  - Pt seen and evaluated.  -Pain is getting somewhat better.  Because of this, we will defer x-rays for now.  -Night splint was dispensed to her.  -Start Tuli's heel cups.  -I did give her stretching exercises.  -Referral for physical therapy.  -See again in 5 weeks.  I spent 60 minutes on the date of service with patient care, documentation, chart review, image review, care coordination

## 2022-03-02 NOTE — LETTER
3/2/2022         RE: Birdget Coyne  [address] [facility] [address]        Dear Colleague,    Thank you for referring your patient, Bridget Coyne, to the [facility] [place]. Please see a copy of my visit note below.    Date of Service: 3/2/2022    Chief Complaint   Patient presents with     Consult     Plantar fasciitis, left foot.           HPI: Bridget is a 56 year old female who presents today for further evaluation of left heel pain.  Nature: sharp/dull    Location: left plantar heel    Duration: 1 month    Onset: The pain started after she had an abrupt slide while she was in her winter boot.    Course: Improving.    Aggravating/alleviating factors: + post-static dyskinesia.     Previous Treatments:RICE        Review of Systems: Answers for HPI/ROS submitted by the patient on 3/2/2022  General Symptoms: No  Skin Symptoms: No  HENT Symptoms: No  EYE SYMPTOMS: No  HEART SYMPTOMS: No  LUNG SYMPTOMS: No  INTESTINAL SYMPTOMS: No  URINARY SYMPTOMS: No  GYNECOLOGIC SYMPTOMS: No  BREAST SYMPTOMS: No  SKELETAL SYMPTOMS: No  BLOOD SYMPTOMS: No  NERVOUS SYSTEM SYMPTOMS: No  MENTAL HEALTH SYMPTOMS: No        PMH:   Past Medical History:   Diagnosis Date     Abnormal Pap smear of cervix 08/16/2018    LSIL, +HR HPV, not 16/18: see problem list     Cervical high risk HPV (human papillomavirus) test positive 08/17/2017, 8/16/18    + HR HPV (not 16 or 18) result.      Herpes simplex without mention of complication      Unspecified contraceptive management        PSxH:   Past Surgical History:   Procedure Laterality Date     COLONOSCOPY       HC TOOTH EXTRACTION W/FORCEP       TONSILLECTOMY         Allergies: No known drug allergies    SH:   Social History     Socioeconomic History     Marital status:      Spouse name: Bill     Number of children: 2     Years of education: 16     Highest education level: Not on file   Occupational History     Occupation: Teacher      Employer: [employer]     Comment: [employer]   Tobacco Use     Smoking status: Never Smoker     Smokeless tobacco: Never Used   Substance and Sexual Activity     Alcohol use: Yes     Comment: 6 drinks per week     Drug use: No     Sexual activity: Yes     Partners: Male     Birth control/protection: Post-menopausal   Other Topics Concern     Parent/sibling w/ CABG, MI or angioplasty before 65F 55M? No   Social History Narrative    2010    Balanced Diet - Yes    Osteoporosis Prevention Measures - Dairy servings per day: 3-4 servings daily    Regular Exercise -  Yes Describe aerobics, run, skii, and weight training    Dental Exam - YES - Date: 12-09    Eye Exam - NO    Self Breast Exam - occassionally    Abuse: Current or Past (Physical, Sexual or Emotional)- No    Do you feel safe in your environment - Yes    Guns stored in the home - No    Sunscreen used - Yes    Seatbelts used - Yes    Lipids -  YES - Date: 10-3-08    Glucose -  YES - Date: 10-3-08    Colon Cancer Screening - No    Hemoccults - NO    Pap Test -  YES - Date: 10-3-08    Do you have any concerns about STD's -  No    Mammography - YES - Date: 2-5-09    DEXA - NO    Immunizations reviewed and up to date - Yes, last TD was 3-6-03    Magy Gonzalez MA                     Social Determinants of Health     Financial Resource Strain: Not on file   Food Insecurity: Not on file   Transportation Needs: Not on file   Physical Activity: Not on file   Stress: Not on file   Social Connections: Not on file   Intimate Partner Violence: Not on file   Housing Stability: Not on file       FH:   Family History   Problem Relation Age of Onset     Hypertension Mother      Lipids Mother      Neurologic Disorder Father         parkinson's disease     Liver Disease Father 70     Diabetes Brother         on oral agent     Heart Disease Maternal Grandmother         heart attack     Colon Cancer Maternal Grandmother      Breast Cancer Paternal  Grandmother         and stomach cancer     Cancer Maternal Aunt         brain cancer     Cancer Paternal Uncle         lung     Breast Cancer Other         cousin     Breast Cancer Other         Great aunt     Breast Cancer Cousin        Objective:  Data Unavailable Data Unavailable Data Unavailable Data Unavailable Data Unavailable 0 lbs 0 oz    PT and DP pulses are 2/4 bilaterally. CRT is instant positive.  Positive pedal hair.   Gross sensation is intact bilaterally.   Equinus is noted bilaterally. No pain with active or passive ROM of the ankle, MTJ, 1st ray, or halluces bilaterally.  Pain noted today with palpation of the plantar attachment of the plantar fascia on the calcaneus on the left side.  No pain on the lateral side.  No pain in the medial band of the plantar fascia.  No pain along the course of the PT, peroneal, Achilles tendons bilaterally.  No pain with lateral calcaneal squeeze.  Nails normal bilaterally. No open lesions are noted.     Assessment: Monica is a 56-year-old with pain in the left heel.  The pain has been improving over the last few weeks.  I recommend that because this, we can start with conservative options.  I recommend that she get a night splint.  We did dispense this to her today.  I also recommended that she start some stretching exercises and then do some formal physical therapy.  She relates that she can do this.  I discussed with her we also have room to escalate her treatment, including cam boot and injection therapy, if needed.    Plan:  - Pt seen and evaluated.  -Pain is getting somewhat better.  Because of this, we will defer x-rays for now.  -Night splint was dispensed to her.  -Start Tuli's heel cups.  -I did give her stretching exercises.  -Referral for physical therapy.  -See again in 5 weeks.  I spent 60 minutes on the date of service with patient care, documentation, chart review, image review, care coordination        Servando Mike DPM

## 2022-03-02 NOTE — NURSING NOTE
Reason For Visit:   Chief Complaint   Patient presents with     Consult     Plantar fasciitis, left foot.        Pain Assessment  Patient Currently in Pain: Yes (Left heel.)        Allergies   Allergen Reactions     No Known Drug Allergies            Roxane Douglas LPN

## 2022-03-16 ENCOUNTER — THERAPY VISIT (OUTPATIENT)
Dept: PHYSICAL THERAPY | Facility: CLINIC | Age: 57
End: 2022-03-16
Payer: COMMERCIAL

## 2022-03-16 DIAGNOSIS — M79.672 LEFT FOOT PAIN: Primary | ICD-10-CM

## 2022-03-16 PROCEDURE — 97161 PT EVAL LOW COMPLEX 20 MIN: CPT | Mod: GP | Performed by: PHYSICAL THERAPIST

## 2022-03-16 PROCEDURE — 97110 THERAPEUTIC EXERCISES: CPT | Mod: GP | Performed by: PHYSICAL THERAPIST

## 2022-03-16 NOTE — PROGRESS NOTES
[facility] Initial Evaluation  3/16/2022     Precautions/Restrictions/MD instructions: eval and treat     Therapist Assessment: Bridget Coyne is a 56 year old female patient presenting to Physical Therapy with L heel pain. Patient demonstrates decreased soleus and gastroc flexibility on L, TTP along medial PF insertion on calcaneous, decreased DF strength on L, foot turned in ER when walking. Signs and symptoms are consistent with L plantar fasciitis. These impairments limit their ability to walk without pain. Skilled PT services are necessary in order to reduce impairments and improve independent function.    Subjective History    Injury/Condition Details:  Presenting Complaint L heel pain   Onset Timing/Date 6 weeks ago, (Doctor's referral 3/2/2022)   Mechanism Slipped and felt a sharp pain      Symptom Behavior Details    Primary Symptoms Sporadic symptoms; Activity/position dependent, pain (Location: medial calcaneous for plantar fascia insertion, Quality: Aching/Throbbing, tearing)    Denies locking, catching, giving way, or instability. Denies numibness, tingling, changes in sensation. Denies having related symptoms spreading to the L calf.    Worst Pain 8 /10 (with first steps in morning)   Symptom Provocators Walking, stairs, first steps in morning    Best Pain 0/10    Symptom Relievers Stretching, ice, boot    Time of day dependent? Worse in morning and Stiffness in morning   Recent symptom change? symptoms improving     Prior Testing/Intervention for current condition:  Prior Tests  None   Prior Treatment Brace     Lifestyle & General Medical History:  Employment Teacher   Usual physical activities  (within past year) Skate cross country ski, cycling, pilates, weight training, yoga    Orthopaedic History  R shoulder pain, R hand pain    Medication  None reported by patient   Notable medical history Menopausal    Patient goals Decrease pain with walking    Patient  Reported Health excellent   Red Flags: (Bold when present) - reviewed the following and denies  Calf pain/swelling/warmth    Foot/Ankle Physical Therapy Examination    Dynamic Movement Screen:  2 leg stance: toe out on L  2 leg squat: Anterior knee translation, Knee valgus, Hip internal rotation and Improper use of glutes/hips    1 leg stance:   Right: proprioceptive challenge   Left: proprioceptive challenge and excessive contralateral pelvic drop    1 leg squat:   Right: normal  Left: proprioceptive challenge, excessive femoral IR/ADD and increased pain in foot     Gait: FOOT and Excessive pronation and external rotation and early heel off     Figure 8 measurement: NT    Range of motion:   ROM Right Left   Dorsiflexion (15-20) 15 10   Plantarflexion (40-55) 55 55   Inversion (30-35) 35 35   Eversion (10-15) 10 10   (*Indicates patient s pain)      Tender to palpation at the following structures: medial calcaneal insertion of plantar fascia L  NOT tender to palpation at the following structures: R side PF, L calf      Resisted Testing:     Right Left   Plantarflexion (Toe Raises) (8)reps (4)reps   Dorsiflexion (5/5) (4/5)   Eversion (4-/5) (4-/5)   Inversion (5/5) (5/5)   Great Toe Extension (5/5) (5/5)   *Indicates patient's pain     Joint Mobility:   Right Left Pain Endfeel   Talocrural (-) (++) (-) (normal)   Subtalar (-) (++) (-) (firm)   Calcaneonavicular (-) (-) (-) (fnormal)   Calcaneocuboid () (-) (-) (normal)   Cuneonavicular (-) (-) (-) (normal)   Ray mobility (-) (-) (-) (normal)   (*Indicates patient s pain)    Special Tests:   R L   Anterior Drawer (-) (-)   Posterior Drawer (-) (-)   Talar Tilt medial (-) (-)   Talar Tilt lateral (-) (-)   External Rotation (-) (-)   Smith's (Achilles) (-) (-)   Kleiger Test (-) (-)   Windlass (-) (-)   Maria Alejandra's (-) (-)     Lower Extremity Muscle Strength (x/5)   Right Left   Hip ER 4-/5 4-/5   Hip IR 4+/5 4+/5   Hip ABD 4-/5 4-/5   Hip ADD 4+/5 4+/5   Hip Ext 4-/5  4-/5   Knee Flex 4+/5 4+/5       ASSESSMENT/PLAN:  The patient is a 56 year old female with chief complaint of L foot pain.    The patient has the following significant findings with corresponding treatment plan.  Diagnosis 1:  Signs and symptoms consistent with L plantar fasciitis     Pain -  manual therapy, splint/taping/bracing/orthotics, self management, education and home program  Decreased ROM/flexibility - manual therapy, therapeutic exercise and home program  Decreased joint mobility - manual therapy, therapeutic exercise and home program  Decreased strength - therapeutic exercise, therapeutic activities and home program  Impaired balance - neuro re-education, therapeutic activities and home program  Decreased proprioception - neuro re-education and therapeutic activities  Impaired gait - gait training and assistive devices  Impaired muscle performance - neuro re-education and home program  Decreased function - therapeutic activities and home program  Impaired posture - neuro re-education, therapeutic activities and home program  Instability -  Therapeutic Activity, Therapeutic Exercise, Neuromuscular Re-education, Splinting/Taping/Bracing/Orthotic, home program      Therapy Evaluation Codes:   1) History comprised of:   Personal factors that impact the plan of care:      None.    Comorbidity factors that impact the plan of care are:      Menopausal.     Medications impacting care: None.  2) Examination of Body Systems comprised of:   Body structures and functions that impact the plan of care:      Ankle.   Activity limitations that impact the plan of care are:      Walking.  3) Clinical presentation characteristics are:   Stable/Uncomplicated.  4) Decision-Making    Low complexity using standardized patient assessment instrument and/or measureable assessment of functional outcome.  Cumulative Therapy Evaluation is: Low complexity.    Previous and current functional limitations:  (See Goal Flow Sheet for  this information)    Short term and Long term goals: (See Goal Flow Sheet for this information)     Communication ability:  Patient appears to be able to clearly communicate and understand verbal and written communication and follow directions correctly.  Treatment Explanation - The following has been discussed with the patient:   RX ordered/plan of care  Anticipated outcomes  Possible risks and side effects  This patient would benefit from PT intervention to resume normal activities.   Rehab potential is good.    Frequency:  1 X week, once daily  Duration:  for 6 visits  Discharge Plan: Achieve all LTGs, be independent in home treatment program, and reach maximal therapeutic benefit.    Please refer to the daily flowsheet for treatment today, total treatment time and time spent performing 1:1 timed codes.

## 2022-03-30 ENCOUNTER — THERAPY VISIT (OUTPATIENT)
Dept: PHYSICAL THERAPY | Facility: CLINIC | Age: 57
End: 2022-03-30
Payer: COMMERCIAL

## 2022-03-30 DIAGNOSIS — M79.672 LEFT FOOT PAIN: ICD-10-CM

## 2022-03-30 PROCEDURE — 97140 MANUAL THERAPY 1/> REGIONS: CPT | Mod: GP | Performed by: PHYSICAL THERAPIST

## 2022-03-30 PROCEDURE — 97110 THERAPEUTIC EXERCISES: CPT | Mod: GP | Performed by: PHYSICAL THERAPIST

## 2022-03-30 PROCEDURE — 97112 NEUROMUSCULAR REEDUCATION: CPT | Mod: GP | Performed by: PHYSICAL THERAPIST

## 2022-05-31 PROBLEM — M79.672 LEFT FOOT PAIN: Status: RESOLVED | Noted: 2022-03-16 | Resolved: 2022-05-31

## 2022-05-31 NOTE — PROGRESS NOTES
Discharge Note    Progress reporting period is from initial evaluation date (please see noted date below) to Mar 30, 2022.  Linked Episodes   Type: Episode: Status: Noted: Resolved: Last update: Updated by:   PHYSICAL THERAPY Left heel pain 3/16/2022 Active 3/16/2022  3/30/2022 12:32 PM Ayala Bell, PT      Comments:       Bridget failed to follow up and current status is unknown.  Please see information below for last relevant information on current status.  Patient seen for 2 visits.    SUBJECTIVE  Subjective changes noted by patient:  Patient states that her feet have been hurting a lot these past 3 weeks due to having to walk 3-5 hours a day for strike at school. She states this has also exacerbated her L plantar fasciitis.   .  Current pain level is 3/10.     Previous pain level was   .   Changes in function:  Yes (See Goal flowsheet attached for changes in current functional level)  Adverse reaction to treatment or activity: None    OBJECTIVE  Changes noted in objective findings: TTP along medial and lateral gastroc/soleus. TTP along medial calcaneal insertion of L plantar fascia      ASSESSMENT/PLAN  Diagnosis: L plantar fasciitis    Updated problem list and treatment plan:   Pain - HEP  Decreased ROM/flexibility - HEP  Decreased function - HEP  Decreased strength - HEP  STG/LTGs have been met or progress has been made towards goals:  Yes, please see goal flowsheet for most current information  Assessment of Progress: current status is unknown.    Last current status: Pt is progressing as expected   Self Management Plans:  HEP  I have re-evaluated this patient and find that the nature, scope, duration and intensity of the therapy is appropriate for the medical condition of the patient.  Bridget continues to require the following intervention to meet STG and LTG's:  HEP.    Recommendations:  Discharge with current home program.  Patient to follow up with MD as needed.    Please refer to the daily flowsheet  for treatment today, total treatment time and time spent performing 1:1 timed codes.

## 2022-06-20 ENCOUNTER — VIRTUAL VISIT (OUTPATIENT)
Dept: FAMILY MEDICINE | Facility: CLINIC | Age: 57
End: 2022-06-20
Payer: COMMERCIAL

## 2022-06-20 DIAGNOSIS — U07.1 INFECTION DUE TO 2019 NOVEL CORONAVIRUS: Primary | ICD-10-CM

## 2022-06-20 DIAGNOSIS — G47.00 INSOMNIA, UNSPECIFIED TYPE: ICD-10-CM

## 2022-06-20 DIAGNOSIS — F40.298 ISOLATED OR SPECIFIC PHOBIA: ICD-10-CM

## 2022-06-20 PROCEDURE — 99214 OFFICE O/P EST MOD 30 MIN: CPT | Mod: GT | Performed by: PHYSICIAN ASSISTANT

## 2022-06-20 RX ORDER — LORAZEPAM 0.5 MG/1
0.25-0.5 TABLET ORAL EVERY 8 HOURS PRN
Qty: 15 TABLET | Refills: 0 | Status: SHIPPED | OUTPATIENT
Start: 2022-06-20 | End: 2023-04-26

## 2022-06-20 RX ORDER — ZOLPIDEM TARTRATE 5 MG/1
TABLET ORAL
Qty: 30 TABLET | Refills: 5 | Status: SHIPPED | OUTPATIENT
Start: 2022-06-20 | End: 2024-09-30

## 2022-06-20 NOTE — PROGRESS NOTES
"Monica is a 56 year old who is being evaluated via a billable video visit.      How would you like to obtain your AVS? MyChart  If the video visit is dropped, the invitation should be resent by: Text to cell phone: [phone]  Will anyone else be joining your video visit? No          Assessment & Plan   Problem List Items Addressed This Visit        Other    Insomnia    Relevant Medications    zolpidem (AMBIEN) 5 MG tablet      Other Visit Diagnoses     Infection due to 2019 novel coronavirus    -  Primary    Relevant Medications    nirmatrelvir and ritonavir (PAXLOVID) therapy pack    Isolated or specific phobia        Relevant Medications    LORazepam (ATIVAN) 0.5 MG tablet         COVID - paxlovid sent - drug drug interactions discussed.  Interactions include Ambien and Advil PM, so she should not take these for 8 days.     Refill ok for Ambien and Ativan -  reviewed, no concerns.                BMI:   Estimated body mass index is 26.67 kg/m  as calculated from the following:    Height as of 1/26/22: 1.67 m (5' 5.75\").    Weight as of 1/26/22: 74.4 kg (164 lb).           No follow-ups on file.    Lavinia Rai PA-C  [facility]    Subjective   Monica is a 56 year old, presenting for the following health issues:  No chief complaint on file.      HPI     Pt would like to know if they should start treatment for COVID 19.    First sx on 6/18/22  Tested positive 6/19/22  Congestion, sneezing, tired, very achy.   Works as a teacher.            Review of Systems         Objective           Vitals:  No vitals were obtained today due to virtual visit.    Physical Exam   GENERAL: Healthy, alert and no distress  EYES: Eyes grossly normal to inspection.  No discharge or erythema, or obvious scleral/conjunctival abnormalities.  RESP: No audible wheeze, cough, or visible cyanosis.  No visible retractions or increased work of breathing.    SKIN: Visible skin clear. No significant rash, " abnormal pigmentation or lesions.  NEURO: Cranial nerves grossly intact.  Mentation and speech appropriate for age.  PSYCH: Mentation appears normal, affect normal/bright, judgement and insight intact, normal speech and appearance well-groomed.                Video-Visit Details    Video Start Time: 10:45    Type of service:  Video Visit    Video End Time:11:00 AM    Originating Location (pt. Location): Home    Distant Location (provider location):  [facility]     Platform used for Video Visit: Curiyo    Allegra Dinh.

## 2022-06-20 NOTE — LETTER
[address] [address]  Phone: [phone]    June 20, 2022        Bridget Coyne  [address] [place] [id number]          To whom it may concern:    RE: rBidget Coyne    Patient was seen and treated today at our clinic and missed work.  Please excuse patient from work the week of 6/20-6/24 due to this illness.     Please contact me for questions or concerns.      Sincerely,        Lavinia Rai PA-C

## 2022-06-20 NOTE — PATIENT INSTRUCTIONS
Instructions for Patients      What are the symptoms of COVID-19?  Symptoms can include fever, cough, shortness of breath, chills, headache, muscle pain sore throat, fatigue, runny or stuffy nose, and loss of taste and smell. Other less common symptoms include nausea, vomiting, or diarrhea (watery stools).    Know when to call 911. Emergency warning signs include:    Trouble breathing or shortness of breath    Pain or pressure in the chest that doesn't go away    Feeling confused like you haven't felt before, or not being able to wake up    Bluish-colored lips or face    How can I take care of myself?  1. Get lots of rest. Drink extra fluids (unless a doctor has told you not to).  2. Take Tylenol (acetaminophen) for fever or pain. If you have liver or kidney problems, ask your family doctor if it's okay to take Tylenol   Adults:   650 mg (two 325 mg pills or tablets) every 4 to 6 hours, or...   1,000 mg (two 500 mg pills or tablets) every 8 hours as needed.  Note: Don't take more than 3,000 mg in one day. Acetaminophen is found in many medicines (both prescribed and over the counter). Read all labels to be sure you don't take too much.  For children, check the Tylenol bottle for the right dose. The dose is based on the child's age or weight.  3. Take over the counter medicines for your symptoms as needed. Talk to your pharmacist.  4. If you have other health problems (like cancer, heart failure, an organ transplant, or severe kidney disease): Call your specialty clinic if you don't feel better in the next 2 days.    These guidelines are for isolating and quarantining before returning to work, school or .     For employers, schools and day cares: This is an official notice for this person s medical guidelines for returning in-person.     For health care sites: The CDC gives different isolation and quarantine guidelines for healthcare sites, please check with these sites before arriving.     How do I  self-isolate?  You isolate when you have symptoms of COVID or a test shows you have COVID, even if you don t have symptoms.     If you DO have symptoms:  o Stay home and away from others  - For at least 5 days after your symptoms started, AND   - You are fever free for 24 hours (with no medicine that reduces fever), AND  - Your other symptoms are better.  o Wear a mask for 10 full days any time you are around others.    If you DON T have symptoms:  o Stay at home and away from others for at least 5 days after your positive test.  o Wear a mask for 10 full days any time you are around others.    How and when do I quarantine?  You quarantine when you may have been exposed to the virus and DON T have symptoms.     Stay home and away from others.     You must quarantine for 5 days after your last contact with a person who has COVID.  o Note: If you are fully vaccinated, you don t need to quarantine. You should still follow the steps below.     Wear a mask for 10 full days any time you re around others.    Get tested at least 5 days after you were exposed, even if you don t have symptoms.     If you start to have symptoms, isolate right away and get tested.    Where can I get more information?    [facility] COVID-19 Resource Hub: [email or web address]/covid19/     CDC Quarantine & Isolation: https://www.cdc.gov/coronavirus/2019-ncov/your-health/quarantine-isolation.html     CDC - What to Do If You're Sick: https://www.cdc.gov/coronavirus/2019-ncov/if-you-are-sick/index.html    [facility] clinical trials (COVID-19 research studies): clinicalaffairs.[facility]/umn-clinical-trials    [place] Department of Health COVID-19 Public Hotline: 1-659.312.3880  Coping with Life After COVID-19  Being in the hospital because of COVID-19 is scary. Going home can be scary, too. You may face changes to your life, the way you work or what you can eat. It s hard to adjust to change, and it s normal to feel afraid,  frustrated or even angry. These feelings usually go away over time. If your feelings don t start to get better, it s called  adjustment disorder.      What signs should I look out for?  Adjustment disorder can happen to anyone in a time of stress. It makes it hard to cope with daily life. You may feel lonely or fight with loved ones, even if you re glad to be home. Watch for these signs:  Fear or worry  Hard time focusing  Sadness or anger  Trouble talking to family or friends  Feeling like you don t fit in or isolating yourself  Problems with sleep   Drinking alcohol or taking drugs to cope    What can I do?  You can help yourself get better. Feeling you have control helps you move forward. You may wonder if you ll be able to do things you did before. Be patient. Do your best to make the most of every day. Try to build relationships, be as active as you can, eat right and keep a sense of humor. Avoid smoking and drinking too much alcohol. Call your family doctor or clinic if you re not sure what to do. They can guide you to care or other services.    When should I get help?  Think about getting counseling if your sadness or frustration gets worse. Together with a trained counselor, you can talk about your problems adjusting to life after your hospital stay. You can come up with new ways to handle changes that give you more control. Your family doctor or care team can help you find a counselor.     Get help if you re thinking about hurting yourself. If you need help right away, call 911 or go to the nearest emergency room. You can also try the Crisis Text Line.    Crisis Text Line: 269-443 (http://www.crisistextline.org)  The Crisis Text Line serves anyone, in any crisis. It gives free, 24/7 support. Here's how it works:  Text HOME to 575595 from anywhere in the [place], anytime, about any type of crisis.  A live, trained Crisis Counselor will text you back quickly.  The volunteer Crisis Counselor can help you move from  a  hot moment  to a  cool moment.  They can also help you work out a safety plan.

## 2022-07-25 ENCOUNTER — TRANSFERRED RECORDS (OUTPATIENT)
Dept: HEALTH INFORMATION MANAGEMENT | Facility: CLINIC | Age: 57
End: 2022-07-25

## 2022-07-29 ENCOUNTER — HOSPITAL ENCOUNTER (OUTPATIENT)
Dept: MAMMOGRAPHY | Facility: CLINIC | Age: 57
Discharge: HOME | End: 2022-07-29
Attending: FAMILY MEDICINE | Admitting: FAMILY MEDICINE
Payer: COMMERCIAL

## 2022-07-29 DIAGNOSIS — Z12.31 VISIT FOR SCREENING MAMMOGRAM: ICD-10-CM

## 2022-07-29 PROCEDURE — 77067 SCR MAMMO BI INCL CAD: CPT

## 2022-10-16 ENCOUNTER — HEALTH MAINTENANCE LETTER (OUTPATIENT)
Age: 57
End: 2022-10-16

## 2023-03-27 ENCOUNTER — E-VISIT (OUTPATIENT)
Dept: URGENT CARE | Facility: CLINIC | Age: 58
End: 2023-03-27
Payer: COMMERCIAL

## 2023-03-27 DIAGNOSIS — R05.9 COUGH, UNSPECIFIED TYPE: Primary | ICD-10-CM

## 2023-03-27 PROCEDURE — 99207 PR NON-BILLABLE SERV PER CHARTING: CPT | Performed by: PREVENTIVE MEDICINE

## 2023-03-27 NOTE — PATIENT INSTRUCTIONS
Dear Bridget Coyne,    We are sorry you are not feeling well. Based on the responses you provided, it is recommended that you be seen in-person in urgent care so we can better evaluate your symptoms. Please click here to find the nearest urgent care location to you.   You will not be charged for this Visit. Thank you for trusting us with your care.    Chris Nance MD, MD

## 2023-04-23 ASSESSMENT — ENCOUNTER SYMPTOMS
NAUSEA: 0
FEVER: 0
CONSTIPATION: 0
HEADACHES: 0
BREAST MASS: 0
MYALGIAS: 0
DIZZINESS: 0
SORE THROAT: 0
PARESTHESIAS: 0
HEMATURIA: 0
HEARTBURN: 0
SHORTNESS OF BREATH: 0
FREQUENCY: 0
JOINT SWELLING: 0
CHILLS: 0
EYE PAIN: 0
HEMATOCHEZIA: 0
DIARRHEA: 0
ABDOMINAL PAIN: 0
DYSURIA: 0
WEAKNESS: 0
PALPITATIONS: 0
NERVOUS/ANXIOUS: 0
ARTHRALGIAS: 0
COUGH: 0

## 2023-04-24 ENCOUNTER — PATIENT OUTREACH (OUTPATIENT)
Dept: ONCOLOGY | Facility: CLINIC | Age: 58
End: 2023-04-24

## 2023-04-24 ENCOUNTER — OFFICE VISIT (OUTPATIENT)
Dept: FAMILY MEDICINE | Facility: CLINIC | Age: 58
End: 2023-04-24
Payer: COMMERCIAL

## 2023-04-24 VITALS
SYSTOLIC BLOOD PRESSURE: 113 MMHG | OXYGEN SATURATION: 97 % | BODY MASS INDEX: 27.57 KG/M2 | DIASTOLIC BLOOD PRESSURE: 76 MMHG | HEIGHT: 64 IN | TEMPERATURE: 97.2 F | RESPIRATION RATE: 16 BRPM | HEART RATE: 76 BPM | WEIGHT: 161.5 LBS

## 2023-04-24 DIAGNOSIS — Z00.00 ROUTINE GENERAL MEDICAL EXAMINATION AT A HEALTH CARE FACILITY: Primary | ICD-10-CM

## 2023-04-24 DIAGNOSIS — B00.9 HERPES SIMPLEX VIRUS (HSV) INFECTION: ICD-10-CM

## 2023-04-24 DIAGNOSIS — Z80.9 FAMILY HISTORY OF CANCER: ICD-10-CM

## 2023-04-24 DIAGNOSIS — F40.298 ISOLATED OR SPECIFIC PHOBIA: ICD-10-CM

## 2023-04-24 DIAGNOSIS — G47.00 INSOMNIA, UNSPECIFIED TYPE: ICD-10-CM

## 2023-04-24 DIAGNOSIS — Z12.4 CERVICAL CANCER SCREENING: ICD-10-CM

## 2023-04-24 DIAGNOSIS — Z13.220 LIPID SCREENING: ICD-10-CM

## 2023-04-24 DIAGNOSIS — Z13.1 SCREENING FOR DIABETES MELLITUS: ICD-10-CM

## 2023-04-24 PROCEDURE — 90746 HEPB VACCINE 3 DOSE ADULT IM: CPT | Performed by: FAMILY MEDICINE

## 2023-04-24 PROCEDURE — G0145 SCR C/V CYTO,THINLAYER,RESCR: HCPCS | Performed by: FAMILY MEDICINE

## 2023-04-24 PROCEDURE — 99214 OFFICE O/P EST MOD 30 MIN: CPT | Mod: 25 | Performed by: FAMILY MEDICINE

## 2023-04-24 PROCEDURE — 90471 IMMUNIZATION ADMIN: CPT | Performed by: FAMILY MEDICINE

## 2023-04-24 PROCEDURE — 99396 PREV VISIT EST AGE 40-64: CPT | Mod: 25 | Performed by: FAMILY MEDICINE

## 2023-04-24 PROCEDURE — 87624 HPV HI-RISK TYP POOLED RSLT: CPT | Performed by: FAMILY MEDICINE

## 2023-04-24 RX ORDER — ACYCLOVIR 50 MG/G
CREAM TOPICAL
Qty: 1 G | Refills: 11 | Status: SHIPPED | OUTPATIENT
Start: 2023-04-24 | End: 2024-01-23

## 2023-04-24 RX ORDER — ZOLPIDEM TARTRATE 5 MG/1
TABLET ORAL
Qty: 30 TABLET | Refills: 5 | Status: CANCELLED | OUTPATIENT
Start: 2023-04-24

## 2023-04-24 RX ORDER — VALACYCLOVIR HYDROCHLORIDE 1 G/1
2000 TABLET, FILM COATED ORAL 2 TIMES DAILY
Qty: 4 TABLET | Refills: 11 | Status: SHIPPED | OUTPATIENT
Start: 2023-04-24 | End: 2024-09-30

## 2023-04-24 ASSESSMENT — ENCOUNTER SYMPTOMS
ARTHRALGIAS: 0
PALPITATIONS: 0
PARESTHESIAS: 0
HEMATOCHEZIA: 0
FEVER: 0
SHORTNESS OF BREATH: 0
DIARRHEA: 0
SORE THROAT: 0
DIZZINESS: 0
HEMATURIA: 0
HEADACHES: 0
DYSURIA: 0
WEAKNESS: 0
HEARTBURN: 0
JOINT SWELLING: 0
FREQUENCY: 0
COUGH: 0
EYE PAIN: 0
CHILLS: 0
BREAST MASS: 0
MYALGIAS: 0
NERVOUS/ANXIOUS: 0
CONSTIPATION: 0
NAUSEA: 0
ABDOMINAL PAIN: 0

## 2023-04-24 ASSESSMENT — PAIN SCALES - GENERAL: PAINLEVEL: NO PAIN (0)

## 2023-04-24 NOTE — PATIENT INSTRUCTIONS

## 2023-04-24 NOTE — NURSING NOTE
Prior to immunization administration, verified patients identity using patient s name and date of birth. Please see Immunization Activity for additional information.     Screening Questionnaire for Adult Immunization    Are you sick today?   No   Do you have allergies to medications, food, a vaccine component or latex?   No   Have you ever had a serious reaction after receiving a vaccination?   No   Do you have a long-term health problem with heart, lung, kidney, or metabolic disease (e.g., diabetes), asthma, a blood disorder, no spleen, complement component deficiency, a cochlear implant, or a spinal fluid leak?  Are you on long-term aspirin therapy?   No   Do you have cancer, leukemia, HIV/AIDS, or any other immune system problem?   No   Do you have a parent, brother, or sister with an immune system problem?   No   In the past 3 months, have you taken medications that affect  your immune system, such as prednisone, other steroids, or anticancer drugs; drugs for the treatment of rheumatoid arthritis, Crohn s disease, or psoriasis; or have you had radiation treatments?   No   Have you had a seizure, or a brain or other nervous system problem?   No   During the past year, have you received a transfusion of blood or blood    products, or been given immune (gamma) globulin or antiviral drug?   No   For women: Are you pregnant or is there a chance you could become       pregnant during the next month?   No   Have you received any vaccinations in the past 4 weeks?   No     Immunization questionnaire answers were all negative.      Injection of Hep B given by Jing Acevedo MA. Patient instructed to remain in clinic for 15 minutes afterwards, and to report any adverse reactions.     Screening performed by Jing Acevedo MA on 4/24/2023 at 3:10 PM.

## 2023-04-24 NOTE — PROGRESS NOTES
SUBJECTIVE:   CC: Monica is an 57 year old who presents for preventive health visit.       4/24/2023     2:08 PM   Additional Questions   Roomed by Jennifer Feliciano   Accompanied by Self   Patient has been advised of split billing requirements and indicates understanding: Yes  Healthy Habits:     Getting at least 3 servings of Calcium per day:  Yes    Bi-annual eye exam:  Yes    Dental care twice a year:  Yes    Sleep apnea or symptoms of sleep apnea:  None    Diet:  Regular (no restrictions)    Frequency of exercise:  4-5 days/week    Duration of exercise:  45-60 minutes    Taking medications regularly:  Yes    Medication side effects:  None    PHQ-2 Total Score: 0    Additional concerns today:  No      Today's PHQ-2 Score:       4/23/2023     3:35 PM   PHQ-2 ( 1999 Pf[employer])   Q1: Little interest or pleasure in doing things 0   Q2: Feeling down, depressed or hopeless 0   PHQ-2 Score 0   Q1: Little interest or pleasure in doing things Not at all    Not at all   Q2: Feeling down, depressed or hopeless Not at all    Not at all   PHQ-2 Score 0    0           Social History     Tobacco Use     Smoking status: Never     Smokeless tobacco: Never   Vaping Use     Vaping status: Never Used   Substance Use Topics     Alcohol use: Yes     Comment: 6 drinks per week             4/23/2023     3:35 PM   Alcohol Use   Prescreen: >3 drinks/day or >7 drinks/week? Yes   AUDIT SCORE  6     Reviewed orders with patient.  Reviewed health maintenance and updated orders accordingly - Yes       Breast Cancer Screening:    FHS-7:       8/13/2021    10:29 AM 7/29/2022     8:14 AM 4/23/2023     3:37 PM   Breast CA Risk Assessment (FHS-7)   Did any of your first-degree relatives have breast or ovarian cancer? Yes No Unknown   Did any of your relatives have bilateral breast cancer? Yes No Unknown   Did any man in your family have breast cancer? No No No   Did any woman in your family have breast and ovarian cancer? Yes No Yes   Did any woman in your  family have breast cancer before age 50 y? No Yes Yes   Do you have 2 or more relatives with breast and/or ovarian cancer? Yes Yes Yes   Do you have 2 or more relatives with breast and/or bowel cancer? Yes No Yes          History of abnormal Pap smear: YES - updated in Problem List and Health Maintenance accordingly      Latest Ref Rng & Units 2019    12:43 PM 2019    11:37 AM 2018    11:24 AM   PAP / HPV   PAP (Historical)   NIL      HPV 16 DNA NEG^Negative Negative    Negative     HPV 18 DNA NEG^Negative Negative    Negative     Other HR HPV NEG^Negative Negative    Positive       Reviewed and updated as needed this visit by clinical staff   Tobacco  Allergies  Meds              Reviewed and updated as needed this visit by Provider                 Past Medical History:   Diagnosis Date     Abnormal Pap smear of cervix 2018    LSIL, +HR HPV, not 16/18: see problem list     Arthritis      Cervical high risk HPV (human papillomavirus) test positive 2017, 18    + HR HPV (not 16 or 18) result.      Herpes simplex without mention of complication      Unspecified contraceptive management       Past Surgical History:   Procedure Laterality Date     COLONOSCOPY       HC TOOTH EXTRACTION W/FORCEP       TONSILLECTOMY       OB History    Para Term  AB Living   5 2 2 0 3 2   SAB IAB Ectopic Multiple Live Births   3 0 0 0 0      # Outcome Date GA Lbr Raul/2nd Weight Sex Delivery Anes PTL Lv   5 Term            4 Term            3 SAB            2 SAB            1 SAB                Review of Systems   Constitutional: Negative for chills and fever.   HENT: Negative for congestion, ear pain, hearing loss and sore throat.    Eyes: Negative for pain and visual disturbance.   Respiratory: Negative for cough and shortness of breath.    Cardiovascular: Negative for chest pain, palpitations and peripheral edema.   Gastrointestinal: Negative for abdominal pain, constipation, diarrhea,  "heartburn, hematochezia and nausea.   Breasts:  Negative for tenderness, breast mass and discharge.   Genitourinary: Positive for vaginal discharge. Negative for dysuria, frequency, genital sores, hematuria, pelvic pain, urgency and vaginal bleeding.   Musculoskeletal: Negative for arthralgias, joint swelling and myalgias.   Skin: Negative for rash.   Neurological: Negative for dizziness, weakness, headaches and paresthesias.   Psychiatric/Behavioral: Negative for mood changes. The patient is not nervous/anxious.      Rare hot flashes at this point in time. Denies vaginal concerns. Doing well in general.     OBJECTIVE:   /76 (BP Location: Right arm, Patient Position: Sitting, Cuff Size: Adult Regular)   Pulse 76   Temp 97.2  F (36.2  C) (Tympanic)   Resp 16   Ht 1.633 m (5' 4.29\")   Wt 73.3 kg (161 lb 8 oz)   LMP 10/10/2016   SpO2 97%   BMI 27.47 kg/m     LMP about 5 years ago    Physical Exam  GENERAL APPEARANCE: healthy, alert and no distress  EYES: Eyes grossly normal to inspection, PERRL and conjunctivae and sclerae normal  HENT: ear canals and TM's normal, nose and mouth without ulcers or lesions, oropharynx clear and oral mucous membranes moist  NECK: no adenopathy, no asymmetry, masses, or scars and thyroid normal to palpation  RESP: lungs clear to auscultation - no rales, rhonchi or wheezes  CV: regular rate and rhythm, normal S1 S2, no S3 or S4, no murmur, click or rub, no peripheral edema and peripheral pulses strong  ABDOMEN: soft, nontender, no hepatosplenomegaly, no masses and bowel sounds normal   (female): normal female external genitalia, normal urethral meatus, vaginal mucosal atrophy noted, normal cervix   MS: no musculoskeletal defects are noted and gait is age appropriate without ataxia  SKIN: no suspicious lesions or rashes  NEURO: Normal strength and tone, sensory exam grossly normal, mentation intact and speech normal  PSYCH: mentation appears normal and affect " normal/bright    Diagnostic Test Results:  Labs reviewed in Epic    ASSESSMENT/PLAN:       ICD-10-CM    1. Routine general medical examination at a health care facility  Z00.00       2. Insomnia, unspecified type  G47.00       3. Cervical cancer screening  Z12.4 Pap Screen with HPV - recommended age 30 - 65 years     HPV Hold (Lab Only)      4. Family history of cancer  Z80.9 Adult Oncology/Hematology  Referral      5. Herpes simplex virus (HSV) infection  B00.9 valACYclovir (VALTREX) 1000 mg tablet     acyclovir (ZOVIRAX) 5 % external cream      6. Isolated or specific phobia  F40.298 LORazepam (ATIVAN) 0.5 MG tablet      7. Lipid screening  Z13.220 Lipid panel reflex to direct LDL Fasting      8. Screening for diabetes mellitus  Z13.1 Glucose         Routine general medical examination at a health care facility  Healthy  Colonoscopy is due 2027  Mammogram completed 7/29/22  Pap with HPV cotesting completed 7/2019 and due-completed today  COVID-19 vaccines are up to date.    She says she received both doses of the Shingrix vaccine at [facility] on [address] (only one documented, but she is sure she had two)   Hep B vaccine recommended today   Would like fasting lipids and glucose     Insomnia, unspecified type  No longer using ambien on a regular basis. Would like to have it available if needed for periodic use. Does not need refills today.       Herpes simplex virus (HSV) infection  Refills given today for episodic treatment.  Uses acyclovir cream periodically - was too expensive to fill last year.  At other times uses the Valtrex.  - acyclovir (ZOVIRAX) 5 % external cream  Dispense: 1 g; Refill: 11  - valACYclovir (VALTREX) 1000 mg tablet  Dispense: 4 tablet; Refill: 11      Family history of cancer   multiple family members with cancer.   - Adult Oncology/Hematology  Referral; Future      Isolated or specific phobia  Refilled lorazepam to use for airplane flights         COUNSELING:  Reviewed preventive  "health counseling, as reflected in patient instructions      BMI:   Estimated body mass index is 27.47 kg/m  as calculated from the following:    Height as of this encounter: 1.633 m (5' 4.29\").    Weight as of this encounter: 73.3 kg (161 lb 8 oz).   Weight management plan: diet and anxiety      She reports that she has never smoked. She has never used smokeless tobacco.          Christal Keith MD   [facility]  "

## 2023-04-24 NOTE — PROGRESS NOTES
Writer received referral to Cancer Risk Management/Genetic Counseling.    Referred for: Multiple family members with cancer     Referral reviewed for appropriate plan, and sent to New Patient Scheduling for completion.    Radha Monge, RN, BSN  Oncology New Patient Nurse Navigator   [facility]  [phone]

## 2023-04-24 NOTE — NURSING NOTE
Patient identified using two patient identifiers.  Ear exam showing wax occlusion completed by RN.  H202/H20 was placed in the both ear(s) via irrigation tool: elephant ear.

## 2023-04-25 ENCOUNTER — TELEPHONE (OUTPATIENT)
Dept: FAMILY MEDICINE | Facility: CLINIC | Age: 58
End: 2023-04-25
Payer: COMMERCIAL

## 2023-04-25 NOTE — TELEPHONE ENCOUNTER
Plan does not cover acyclovir (ZOVIRAX) 5 % external cream.  Please start a new PA.    Reason for denial:      Insurance plan:    Patient ID: [id number]

## 2023-04-26 LAB
BKR LAB AP GYN ADEQUACY: NORMAL
BKR LAB AP GYN INTERPRETATION: NORMAL
BKR LAB AP HPV REFLEX: NORMAL
BKR LAB AP PREVIOUS ABNORMAL: NORMAL
PATH REPORT.COMMENTS IMP SPEC: NORMAL
PATH REPORT.COMMENTS IMP SPEC: NORMAL
PATH REPORT.RELEVANT HX SPEC: NORMAL

## 2023-04-26 RX ORDER — LORAZEPAM 0.5 MG/1
0.25-0.5 TABLET ORAL EVERY 8 HOURS PRN
Qty: 15 TABLET | Refills: 0 | Status: SHIPPED | OUTPATIENT
Start: 2023-04-26 | End: 2024-09-30

## 2023-04-27 LAB
HUMAN PAPILLOMA VIRUS 16 DNA: NEGATIVE
HUMAN PAPILLOMA VIRUS 18 DNA: NEGATIVE
HUMAN PAPILLOMA VIRUS FINAL DIAGNOSIS: NORMAL
HUMAN PAPILLOMA VIRUS OTHER HR: NEGATIVE

## 2023-04-29 NOTE — TELEPHONE ENCOUNTER
PRIOR AUTHORIZATION DENIED    Medication: acyclovir (ZOVIRAX) 5 % external cream    Denial Date: 4/29/2023    Denial Rational:           Appeal Information:

## 2023-04-29 NOTE — TELEPHONE ENCOUNTER
Central Prior Authorization Team   Phone: [phone]      PA Initiation    Medication: acyclovir (ZOVIRAX) 5 % external cream  Insurance Company: [employer] ([facility]) - Phone [phone] Fax [phone]  Pharmacy Filling the Rx: [id number] - [place], [place] - [address] AT [facility] [address]  Filling Pharmacy Phone: [phone]  Filling Pharmacy Fax:    Start Date: 4/29/2023

## 2023-05-01 PROBLEM — R87.612 PAPANICOLAOU SMEAR OF CERVIX WITH LOW GRADE SQUAMOUS INTRAEPITHELIAL LESION (LGSIL): Status: ACTIVE | Noted: 2018-08-16

## 2023-06-29 ENCOUNTER — PATIENT OUTREACH (OUTPATIENT)
Dept: CARE COORDINATION | Facility: CLINIC | Age: 58
End: 2023-06-29
Payer: COMMERCIAL

## 2023-07-27 ENCOUNTER — PATIENT OUTREACH (OUTPATIENT)
Dept: CARE COORDINATION | Facility: CLINIC | Age: 58
End: 2023-07-27
Payer: COMMERCIAL

## 2023-08-17 ENCOUNTER — LAB (OUTPATIENT)
Dept: LAB | Facility: CLINIC | Age: 58
End: 2023-08-17
Payer: COMMERCIAL

## 2023-08-17 DIAGNOSIS — Z13.220 LIPID SCREENING: ICD-10-CM

## 2023-08-17 DIAGNOSIS — Z13.1 SCREENING FOR DIABETES MELLITUS: ICD-10-CM

## 2023-08-17 LAB
CHOLEST SERPL-MCNC: 252 MG/DL
FASTING STATUS PATIENT QL REPORTED: YES
GLUCOSE SERPL-MCNC: 85 MG/DL (ref 70–99)
HDLC SERPL-MCNC: 94 MG/DL
LDLC SERPL CALC-MCNC: 136 MG/DL
NONHDLC SERPL-MCNC: 158 MG/DL
TRIGL SERPL-MCNC: 112 MG/DL

## 2023-08-17 PROCEDURE — 80061 LIPID PANEL: CPT

## 2023-08-17 PROCEDURE — 36415 COLL VENOUS BLD VENIPUNCTURE: CPT

## 2023-08-17 PROCEDURE — 82947 ASSAY GLUCOSE BLOOD QUANT: CPT

## 2023-08-23 ENCOUNTER — TRANSFERRED RECORDS (OUTPATIENT)
Dept: HEALTH INFORMATION MANAGEMENT | Facility: CLINIC | Age: 58
End: 2023-08-23
Payer: COMMERCIAL

## 2023-11-17 ENCOUNTER — PATIENT OUTREACH (OUTPATIENT)
Dept: GASTROENTEROLOGY | Facility: CLINIC | Age: 58
End: 2023-11-17
Payer: COMMERCIAL

## 2024-01-23 ENCOUNTER — MYC REFILL (OUTPATIENT)
Dept: FAMILY MEDICINE | Facility: CLINIC | Age: 59
End: 2024-01-23
Payer: COMMERCIAL

## 2024-01-23 ENCOUNTER — TELEPHONE (OUTPATIENT)
Dept: FAMILY MEDICINE | Facility: CLINIC | Age: 59
End: 2024-01-23
Payer: COMMERCIAL

## 2024-01-23 DIAGNOSIS — B00.9 HERPES SIMPLEX VIRUS (HSV) INFECTION: ICD-10-CM

## 2024-01-23 RX ORDER — ACYCLOVIR 50 MG/G
CREAM TOPICAL
Qty: 1 G | Refills: 11 | Status: SHIPPED | OUTPATIENT
Start: 2024-01-23 | End: 2024-09-30

## 2024-01-23 NOTE — TELEPHONE ENCOUNTER
Number of times applied daily? (Dose frequency)     Plan does not cover acyclovir (ZOVIRAX) 5 % external cream.  Please start a new PA.    Reason for denial: Plan does not cover this med.    Insurance plan:    Patient ID: [id number]

## 2024-02-03 NOTE — TELEPHONE ENCOUNTER
Retail Pharmacy Prior Authorization Team   Phone: [phone]    PA Initiation    Medication: ACYCLOVIR 5 % EX CREA  Insurance Company: [employer] ([facility]) - Phone [phone] Fax [phone]  Pharmacy Filling the Rx: [id number] - [place], [place] - [address] AT [facility] [address]  Filling Pharmacy Phone: [phone]  Filling Pharmacy Fax:    Start Date: 2/3/2024

## 2024-02-05 NOTE — TELEPHONE ENCOUNTER
Note: Due to record-high volumes, our turn-around is time taking longer than normal . We are currently 8 days behind in the pools.   We are working diligently to submit all requests in a timely manner and in the order they are received. Please only flag true urgent requests as high priority to the pool at this time.   If you have questions - please send a note/message in the active PA encounter and send back to the RPPA (Retail Pharmacy Prior Authorization) team [[id number]].    If you have more specific questions about our process please reach out to our supervisor Bere Mills.   Thank you!     PRIOR AUTHORIZATION DENIED    Medication: ACYCLOVIR 5 % EX CREA  Insurance Company: [employer] ([facility]) - Phone [phone] Fax [phone]  Denial Date: 2/3/2024  Denial Rational:         Appeal Information: N/A      Patient Notified: No

## 2024-03-25 ENCOUNTER — PATIENT OUTREACH (OUTPATIENT)
Dept: CARE COORDINATION | Facility: CLINIC | Age: 59
End: 2024-03-25
Payer: COMMERCIAL

## 2024-04-08 ENCOUNTER — PATIENT OUTREACH (OUTPATIENT)
Dept: CARE COORDINATION | Facility: CLINIC | Age: 59
End: 2024-04-08
Payer: COMMERCIAL

## 2024-06-01 ENCOUNTER — HEALTH MAINTENANCE LETTER (OUTPATIENT)
Age: 59
End: 2024-06-01

## 2024-07-24 ENCOUNTER — PATIENT OUTREACH (OUTPATIENT)
Dept: CARE COORDINATION | Facility: CLINIC | Age: 59
End: 2024-07-24
Payer: COMMERCIAL

## 2024-08-15 ENCOUNTER — MYC MEDICAL ADVICE (OUTPATIENT)
Dept: FAMILY MEDICINE | Facility: CLINIC | Age: 59
End: 2024-08-15
Payer: COMMERCIAL

## 2024-08-15 NOTE — TELEPHONE ENCOUNTER
Writer replied to patient via [employer].  Scheduled for 0800 tomorrow.    HANK PulidoN, RN (she/her)  [facility] RN

## 2024-08-18 ENCOUNTER — OFFICE VISIT (OUTPATIENT)
Dept: URGENT CARE | Facility: URGENT CARE | Age: 59
End: 2024-08-18
Payer: COMMERCIAL

## 2024-08-18 DIAGNOSIS — Z23 NEED FOR VACCINATION: Primary | ICD-10-CM

## 2024-08-18 PROCEDURE — 90715 TDAP VACCINE 7 YRS/> IM: CPT

## 2024-08-18 PROCEDURE — 99207 PR NO CHARGE NURSE ONLY: CPT

## 2024-08-18 PROCEDURE — 90471 IMMUNIZATION ADMIN: CPT

## 2024-08-18 NOTE — PROGRESS NOTES
Immunization was given without incident.    Immunization was administered on 8/16/2024   Writer was unable to document due to pt being a no show at initial appointment.  Prema Nathan/ MA

## 2024-08-21 ENCOUNTER — PATIENT OUTREACH (OUTPATIENT)
Dept: CARE COORDINATION | Facility: CLINIC | Age: 59
End: 2024-08-21
Payer: COMMERCIAL

## 2024-09-30 ENCOUNTER — PATIENT OUTREACH (OUTPATIENT)
Dept: ONCOLOGY | Facility: CLINIC | Age: 59
End: 2024-09-30

## 2024-09-30 ENCOUNTER — OFFICE VISIT (OUTPATIENT)
Dept: FAMILY MEDICINE | Facility: CLINIC | Age: 59
End: 2024-09-30
Payer: COMMERCIAL

## 2024-09-30 VITALS
SYSTOLIC BLOOD PRESSURE: 122 MMHG | DIASTOLIC BLOOD PRESSURE: 88 MMHG | HEART RATE: 64 BPM | TEMPERATURE: 98.2 F | BODY MASS INDEX: 28.61 KG/M2 | OXYGEN SATURATION: 96 % | WEIGHT: 167.6 LBS | RESPIRATION RATE: 15 BRPM | HEIGHT: 64 IN

## 2024-09-30 DIAGNOSIS — G47.00 INSOMNIA, UNSPECIFIED TYPE: ICD-10-CM

## 2024-09-30 DIAGNOSIS — H61.23 BILATERAL IMPACTED CERUMEN: ICD-10-CM

## 2024-09-30 DIAGNOSIS — Z13.1 SCREENING FOR DIABETES MELLITUS: ICD-10-CM

## 2024-09-30 DIAGNOSIS — Z91.89 AT HIGH RISK FOR BREAST CANCER: ICD-10-CM

## 2024-09-30 DIAGNOSIS — Z00.00 ROUTINE GENERAL MEDICAL EXAMINATION AT A HEALTH CARE FACILITY: Primary | ICD-10-CM

## 2024-09-30 DIAGNOSIS — Z13.220 LIPID SCREENING: ICD-10-CM

## 2024-09-30 DIAGNOSIS — Z12.31 SCREENING MAMMOGRAM, ENCOUNTER FOR: ICD-10-CM

## 2024-09-30 DIAGNOSIS — Z78.0 POST-MENOPAUSAL: ICD-10-CM

## 2024-09-30 DIAGNOSIS — F40.298 ISOLATED OR SPECIFIC PHOBIA: ICD-10-CM

## 2024-09-30 DIAGNOSIS — B00.9 HERPES SIMPLEX VIRUS (HSV) INFECTION: ICD-10-CM

## 2024-09-30 DIAGNOSIS — Z80.9 FAMILY HISTORY OF CANCER: ICD-10-CM

## 2024-09-30 LAB
ALBUMIN SERPL BCG-MCNC: 4.5 G/DL (ref 3.5–5.2)
ALP SERPL-CCNC: 56 U/L (ref 40–150)
ALT SERPL W P-5'-P-CCNC: 26 U/L (ref 0–50)
ANION GAP SERPL CALCULATED.3IONS-SCNC: 11 MMOL/L (ref 7–15)
AST SERPL W P-5'-P-CCNC: 27 U/L (ref 0–45)
BILIRUB SERPL-MCNC: 0.2 MG/DL
BUN SERPL-MCNC: 17.2 MG/DL (ref 8–23)
CALCIUM SERPL-MCNC: 9.5 MG/DL (ref 8.8–10.4)
CHLORIDE SERPL-SCNC: 104 MMOL/L (ref 98–107)
CHOLEST SERPL-MCNC: 231 MG/DL
CREAT SERPL-MCNC: 0.67 MG/DL (ref 0.51–0.95)
EGFRCR SERPLBLD CKD-EPI 2021: >90 ML/MIN/1.73M2
FASTING STATUS PATIENT QL REPORTED: NO
FASTING STATUS PATIENT QL REPORTED: NO
GLUCOSE SERPL-MCNC: 90 MG/DL (ref 70–99)
HCO3 SERPL-SCNC: 26 MMOL/L (ref 22–29)
HDLC SERPL-MCNC: 84 MG/DL
LDLC SERPL CALC-MCNC: 111 MG/DL
NONHDLC SERPL-MCNC: 147 MG/DL
POTASSIUM SERPL-SCNC: 4.5 MMOL/L (ref 3.4–5.3)
PROT SERPL-MCNC: 7.4 G/DL (ref 6.4–8.3)
SODIUM SERPL-SCNC: 141 MMOL/L (ref 135–145)
TRIGL SERPL-MCNC: 180 MG/DL

## 2024-09-30 PROCEDURE — 90471 IMMUNIZATION ADMIN: CPT | Performed by: FAMILY MEDICINE

## 2024-09-30 PROCEDURE — 99396 PREV VISIT EST AGE 40-64: CPT | Mod: 25 | Performed by: FAMILY MEDICINE

## 2024-09-30 PROCEDURE — 36415 COLL VENOUS BLD VENIPUNCTURE: CPT | Performed by: FAMILY MEDICINE

## 2024-09-30 PROCEDURE — 90480 ADMN SARSCOV2 VAC 1/ONLY CMP: CPT | Performed by: FAMILY MEDICINE

## 2024-09-30 PROCEDURE — 69209 REMOVE IMPACTED EAR WAX UNI: CPT | Mod: 50 | Performed by: FAMILY MEDICINE

## 2024-09-30 PROCEDURE — 99213 OFFICE O/P EST LOW 20 MIN: CPT | Mod: 25 | Performed by: FAMILY MEDICINE

## 2024-09-30 PROCEDURE — 90673 RIV3 VACCINE NO PRESERV IM: CPT | Performed by: FAMILY MEDICINE

## 2024-09-30 PROCEDURE — 80061 LIPID PANEL: CPT | Performed by: FAMILY MEDICINE

## 2024-09-30 PROCEDURE — 80053 COMPREHEN METABOLIC PANEL: CPT | Performed by: FAMILY MEDICINE

## 2024-09-30 PROCEDURE — 91320 SARSCV2 VAC 30MCG TRS-SUC IM: CPT | Performed by: FAMILY MEDICINE

## 2024-09-30 RX ORDER — ZOLPIDEM TARTRATE 5 MG/1
TABLET ORAL
Qty: 10 TABLET | Refills: 1 | Status: SHIPPED | OUTPATIENT
Start: 2024-09-30

## 2024-09-30 RX ORDER — VALACYCLOVIR HYDROCHLORIDE 1 G/1
2000 TABLET, FILM COATED ORAL 2 TIMES DAILY
Qty: 4 TABLET | Refills: 11 | Status: SHIPPED | OUTPATIENT
Start: 2024-09-30

## 2024-09-30 RX ORDER — LORAZEPAM 0.5 MG/1
0.25-0.5 TABLET ORAL EVERY 8 HOURS PRN
Qty: 15 TABLET | Refills: 0 | Status: SHIPPED | OUTPATIENT
Start: 2024-09-30

## 2024-09-30 RX ORDER — ACYCLOVIR 50 MG/G
CREAM TOPICAL
Qty: 1 G | Refills: 11 | Status: CANCELLED | OUTPATIENT
Start: 2024-09-30

## 2024-09-30 SDOH — HEALTH STABILITY: PHYSICAL HEALTH: ON AVERAGE, HOW MANY DAYS PER WEEK DO YOU ENGAGE IN MODERATE TO STRENUOUS EXERCISE (LIKE A BRISK WALK)?: 5 DAYS

## 2024-09-30 ASSESSMENT — PAIN SCALES - GENERAL: PAINLEVEL: NO PAIN (0)

## 2024-09-30 ASSESSMENT — SOCIAL DETERMINANTS OF HEALTH (SDOH): HOW OFTEN DO YOU GET TOGETHER WITH FRIENDS OR RELATIVES?: ONCE A WEEK

## 2024-09-30 NOTE — PATIENT INSTRUCTIONS
Patient Education   Schedule a mammogram.    Schedule a bone density test (DEXA scan), preferably at [facility] or [facility].    Make sure you are getting at least two days of strength training per week.   Talk with Hattie about scheduling mammogram and breast MRI (alternating every 6 months)   Schedule with the cancer genetics clinic   Work on limiting alcohol to max 4 beverages per week.   Things we should all do for bone health:      1.  CALCIUM Recommendation for women 51 and older 1200 mg/day (19-50 years 1000 mg/day) in divided doses of no more than 400-500 mg/dose. This includes both what is in your food AND what is in any supplements you are taking.  Read the labels carefully.    Some Dietary sources of calcium: These also contain vitamin D  Milk                            8 oz            300 mg  Yogurt                          1 cup           400 mg  Hard cheese                     1.5 oz          300 mg  Cottage cheese                  2 cup           300 mg  Orange juice with Calcium       8 oz            300 mg  Low fat dairy sources are recommended    2.  Adequate vitamin D    Recommended vitamin D intake  Over age 50: 800-1000 IU/day  Safe upper limit for vitamin D 4000 IU/day      Good dietary sources of vitamin D:  Fatty fish  liver  Egg yolks  Vitamin D fortified milk, juices, cereals    3.  Bone health exercise  A) 30 minutes of moderate exercise on most days of the week   Weight bearing exercise (ie, walking, jogging, hiking, dancing)    B) Strength training 2 or more times/week in addition to other weight -being exercise.    Strength training  uses weight or resistance beyond that seen in everyday activities -(pilates, weight training with free weights, weight machines or resistance bands)    Weight supporting activities such as water aerobics, floor exercises and stationary cycling may be more appropriate for some patients with compromised bone health    Spinal fractures: AVOID activities  that place significant force on the spine such as horse back riding,  tennis, golf or bowling    For OSTEOPOROSIS of the spine: avoid exercise machines that incorporate spinal flexion, trunk rotation, or forward bending   Specifically avoid abdominal exercisers, stationary bikes with moving handles, cross-country ski machines, rowing machines, and bicep curl machines   Preventive Care Advice   This is general advice given by our system to help you stay healthy. However, your care team may have specific advice just for you. Please talk to your care team about your preventive care needs.  Nutrition  Eat 5 or more servings of fruits and vegetables each day.  Try wheat bread, brown rice and whole grain pasta (instead of white bread, rice, and pasta).  Get enough calcium and vitamin D. Check the label on foods and aim for 100% of the RDA (recommended daily allowance).  Lifestyle  Exercise at least 150 minutes each week  (30 minutes a day, 5 days a week).  Do muscle strengthening activities 2 days a week. These help control your weight and prevent disease.  No smoking.  Wear sunscreen to prevent skin cancer.  Have a dental exam and cleaning every 6 months.  Yearly exams  See your health care team every year to talk about:  Any changes in your health.  Any medicines your care team has prescribed.  Preventive care, family planning, and ways to prevent chronic diseases.  Shots (vaccines)   HPV shots (up to age 26), if you've never had them before.  Hepatitis B shots (up to age 59), if you've never had them before.  COVID-19 shot: Get this shot when it's due.  Flu shot: Get a flu shot every year.  Tetanus shot: Get a tetanus shot every 10 years.  Pneumococcal, hepatitis A, and RSV shots: Ask your care team if you need these based on your risk.  Shingles shot (for age 50 and up)  General health tests  Diabetes screening:  Starting at age 35, Get screened for diabetes at least every 3 years.  If you are younger than age 35, ask  your care team if you should be screened for diabetes.  Cholesterol test: At age 39, start having a cholesterol test every 5 years, or more often if advised.  Bone density scan (DEXA): At age 50, ask your care team if you should have this scan for osteoporosis (brittle bones).  Hepatitis C: Get tested at least once in your life.  STIs (sexually transmitted infections)  Before age 24: Ask your care team if you should be screened for STIs.  After age 24: Get screened for STIs if you're at risk. You are at risk for STIs (including HIV) if:  You are sexually active with more than one person.  You don't use condoms every time.  You or a partner was diagnosed with a sexually transmitted infection.  If you are at risk for HIV, ask about PrEP medicine to prevent HIV.  Get tested for HIV at least once in your life, whether you are at risk for HIV or not.  Cancer screening tests  Cervical cancer screening: If you have a cervix, begin getting regular cervical cancer screening tests starting at age 21.  Breast cancer scan (mammogram): If you've ever had breasts, begin having regular mammograms starting at age 40. This is a scan to check for breast cancer.  Colon cancer screening: It is important to start screening for colon cancer at age 45.  Have a colonoscopy test every 10 years (or more often if you're at risk) Or, ask your provider about stool tests like a FIT test every year or Cologuard test every 3 years.  To learn more about your testing options, visit:   .  For help making a decision, visit:   https://bit.ly/ns09483.  Prostate cancer screening test: If you have a prostate, ask your care team if a prostate cancer screening test (PSA) at age 55 is right for you.  Lung cancer screening: If you are a current or former smoker ages 50 to 80, ask your care team if ongoing lung cancer screenings are right for you.  For informational purposes only. Not to replace the advice of your health care provider. Copyright   2023  [facility]. All rights reserved. Clinically reviewed by the [facility] Transitions Program. [place] 322142 - REV 01/24.  Preventing Falls: Care Instructions  Injuries and health problems such as trouble walking or poor eyesight can increase your risk of falling. So can some medicines. But there are things you can do to help prevent falls. You can exercise to get stronger. You can also arrange your home to make it safer.    Talk to your doctor about the medicines you take. Ask if any of them increase the risk of falls and whether they can be changed or stopped.   Try to exercise regularly. It can help improve your strength and balance. This can help lower your risk of falling.     Practice fall safety and prevention.    Wear low-heeled shoes that fit well and give your feet good support. Talk to your doctor if you have foot problems that make this hard.  Carry a cellphone or wear a medical alert device that you can use to call for help.  Use stepladders instead of chairs to reach high objects. Don't climb if you're at risk for falls. Ask for help, if needed.  Wear the correct eyeglasses, if you need them.    Make your home safer.    Remove rugs, cords, clutter, and furniture from walkways.  Keep your house well lit. Use night-lights in hallways and bathrooms.  Install and use sturdy handrails on stairways.  Wear nonskid footwear, even inside. Don't walk barefoot or in socks without shoes.    Be safe outside.    Use handrails, curb cuts, and ramps whenever possible.  Keep your hands free by using a shoulder bag or backpack.  Try to walk in well-lit areas. Watch out for uneven ground, changes in pavement, and debris.  Be careful in the winter. Walk on the grass or gravel when sidewalks are slippery. Use de-icer on steps and walkways. Add non-slip devices to shoes.    Put grab bars and nonskid mats in your shower or tub and near the toilet. Try to use a shower chair or bath bench when bathing.  "  Get into a tub or shower by putting in your weaker leg first. Get out with your strong side first. Have a phone or medical alert device in the bathroom with you.   Where can you learn more?  Go to https://www.[employer].net/patiented  Enter G117 in the search box to learn more about \"Preventing Falls: Care Instructions.\"  Current as of: July 17, 2023               Content Version: 14.0    2082-7111 [employer].   Care instructions adapted under license by your healthcare professional. If you have questions about a medical condition or this instruction, always ask your healthcare professional. [employer] disclaims any warranty or liability for your use of this information.      9 Ways to Cut Back on Drinking  Maybe you've found yourself drinking more alcohol than you'd prefer. If you want to cut back, here are some ideas to try.    Think before you drink.  Do you really want a drink, or is it just a habit? If you're used to having a drink at a certain time, try doing something else then.     Look for substitutes.  Find some no-alcohol drinks that you enjoy, like flavored seltzer water, tea with honey, or tonic with a slice of lime. Or try alcohol-free beer or \"virgin\" cocktails (without the alcohol).     Drink more water.  Use water to quench your thirst. Drink a glass of water before you have any alcohol. Have another glass along with every drink or between drinks.     Shrink your drink.  For example, have a bottle of beer instead of a pint. Use a smaller glass for wine. Choose drinks with lower alcohol content (ABV%). Or use less liquor and more mixer in cocktails.     Slow down.  It's easy to drink quickly and without thinking about it. Pay attention, and make each drink last longer.     Do the math.  Total up how much you spend on alcohol each month. How much is that a year? If you cut back, what could you do with the money you save?     Take a break.  Choose a day or two each week " "when you won't drink at all. Notice how you feel on those days, physically and emotionally. How did you sleep? Do you feel better? Over time, add more break days.     Count calories.  Would you like to lose some weight? For some people that's a good motivator for cutting back. Figure out how many calories are in each drink. How many does that add up to in a day? In a week? In a month?     Practice saying no.  Be ready when someone offers you a drink. Try: \"Thanks, I've had enough.\" Or \"Thanks, but I'm cutting back.\" Or \"No, thanks. I feel better when I drink less.\"   Current as of: November 15, 2023  Content Version: 14.1 2006-2024 [employer].   Care instructions adapted under license by your healthcare professional. If you have questions about a medical condition or this instruction, always ask your healthcare professional. [employer] disclaims any warranty or liability for your use of this information.     "

## 2024-09-30 NOTE — PROGRESS NOTES
Preventive Care Visit  [facility]  Christal Keith MD Family Medicine  Sep 30, 2024      Assessment & Plan       ICD-10-CM    1. Routine general medical examination at a health care facility  Z00.00       2. Screening mammogram, encounter for  Z12.31 MA Screening Bilateral w/ [facility]      3. Screening for diabetes mellitus  Z13.1       4. Lipid screening  Z13.220 Lipid panel reflex to direct LDL Fasting      5. Herpes simplex virus (HSV) infection  B00.9 Comprehensive metabolic panel (BMP + Alb, Alk Phos, ALT, AST, Total. Bili, TP)     valACYclovir (VALTREX) 1000 mg tablet      6. Isolated or specific phobia  F40.298 LORazepam (ATIVAN) 0.5 MG tablet      7. Post-menopausal  Z78.0 DX Bone Density      8. Family history of cancer  Z80.9 Adult Oncology/Hematology  Referral      9. Insomnia, unspecified type  G47.00 zolpidem (AMBIEN) 5 MG tablet      10. At high risk for breast cancer  Z91.89          Routine general medical examination at a health care facility  Healthy  Colonoscopy is due 2027  Mammogram completed 8.23.23  Pap with HPV cotesting due in 4/26  Immunizations: influenza and COVID-19 vaccines are recommended  Would like fasting lipids and glucose  Bone health: schedule DEXA   She says she received both doses of the Shingrix vaccine at [facility] [address] (only one documented, but she is sure she had two)       Insomnia, unspecified type  No longer using ambien on a regular basis. Would like to have #10 available if needed for periodic use.       Herpes simplex virus (HSV) infection  Refills given today for episodic treatment.    CMP  - valACYclovir (VALTREX) 1000 mg tablet  Dispense: 4 tablet; Refill: 11      Family history of cancer   multiple family members with cancer.    - Adult Oncology/Hematology  Referral; Future    High breast cancer risk  TLR 25.73% noted when screened at [facility] last year. They recommended alternating breast MRI, which she will consider. She can  "schedule both through [facility] and will have [facility] fax results to me.      Isolated or specific phobia  Refilled lorazepam to use for airplane flights      Post menopausal  Schedule DEXA             BMI  Estimated body mass index is 28.61 kg/m  as calculated from the following:    Height as of this encounter: 1.63 m (5' 4.17\").    Weight as of this encounter: 76 kg (167 lb 9.6 oz).   Weight management plan: diet and exercise    Counseling  Appropriate preventive services were addressed with this patient via screening, questionnaire, or discussion as appropriate for fall prevention, nutrition, physical activity, Tobacco-use cessation, social engagement, weight loss and cognition.  Checklist reviewing preventive services available has been given to the patient.  Reviewed patient's diet, addressing concerns and/or questions.   She is at risk for psychosocial distress and has been provided with information to reduce risk.   The patient reports drinking more than 3 alcoholic drinks per day and/or more than 7 drhnks per week. The patient was counseled and given information about possible harmful effects of excessive alcohol intake.        Ghislaine Anderson is a 58 year old, presenting for the following:  Physical        9/30/2024     1:12 PM   Additional Questions   Roomed by Roselyn REYES   Accompanied by self         9/30/2024     1:12 PM   Patient Reported Additional Medications   Patient reports taking the following new medications None       Health Care Directive  Patient does not have a Health Care Directive or Living Will: Discussed advance care planning with patient; information given to patient to review.    HPI  She hopes to work on gaining strength, increasing exercise, working on weight loss this year. Has recovered from left shoulder fracture while skiing - fell on ice. Healed well. No concerns about bone health from ortho.        9/30/2024   General Health   How would you rate your overall physical health? Good "   Feel stress (tense, anxious, or unable to sleep) Only a little      (!) STRESS CONCERN      9/30/2024   Nutrition   Three or more servings of calcium each day? Yes   Diet: Regular (no restrictions)   How many servings of fruit and vegetables per day? 4 or more   How many sweetened beverages each day? (!) 3            9/30/2024   Exercise   Days per week of moderate/strenous exercise 5 days            9/30/2024   Social Factors   Frequency of gathering with friends or relatives Once a week   Worry food won't last until get money to buy more No   Food not last or not have enough money for food? No   Do you have housing? (Housing is defined as stable permanent housing and does not include staying ouside in a car, in a tent, in an abandoned building, in an overnight shelter, or couch-surfing.) Yes   Are you worried about losing your housing? No   Lack of transportation? No   Unable to get utilities (heat,electricity)? No            9/30/2024   Fall Risk   Fallen 2 or more times in the past year? Yes   Trouble with walking or balance? No              9/30/2024   Dental   Dentist two times every year? Yes            9/30/2024   TB Screening   Were you born outside of the [place]? Yes            Today's PHQ-2 Score:       9/30/2024     1:12 PM   PHQ-2 ( 1999 [employer])   Q1: Little interest or pleasure in doing things 0   Q2: Feeling down, depressed or hopeless 0   PHQ-2 Score 0   Q1: Little interest or pleasure in doing things Not at all   Q2: Feeling down, depressed or hopeless Not at all   PHQ-2 Score 0           9/30/2024   Substance Use   Alcohol more than 3/day or more than 7/wk Yes   How often do you have a drink containing alcohol 2 to 3 times a week   How many alcohol drinks on typical day 3 or 4   How often do you have 5+ drinks at one occasion Never   Audit 2/3 Score 1   How often not able to stop drinking once started Never   How often failed to do what normally expected Never   How often needed first drink in am  after a heavy drinking session Never   How often feeling of guilt or remorse after drinking Never   How often unable to remember what happened the night before Never   Have you or someone else been injured because of your drinking No   Has anyone been concerned or suggested you cut down on drinking No   TOTAL SCORE - AUDIT 4   Do you use any other substances recreationally? No        Social History     Tobacco Use    Smoking status: Never    Smokeless tobacco: Never   Vaping Use    Vaping status: Never Used   Substance Use Topics    Alcohol use: Yes     Comment: 6 drinks per week    Drug use: No           4/23/2023   LAST FHS-7 RESULTS   1st degree relative breast or ovarian cancer Unknown   Any relative bilateral breast cancer Unknown   Any male have breast cancer No   Any ONE woman have BOTH breast AND ovarian cancer Yes   Any woman with breast cancer before 50yrs Yes   2 or more relatives with breast AND/OR ovarian cancer Yes   2 or more relatives with breast AND/OR bowel cancer Yes           Mammogram Screening - Annual screen due to greater than 20% lifetime risk as estimated by Breast Cancer Risk Calculator        9/30/2024   STI Screening   New sexual partner(s) since last STI/HIV test? No        History of abnormal Pap smear: YES - reflected in Problem List and Health Maintenance accordingly        Latest Ref Rng & Units 4/24/2023     2:32 PM 7/23/2019    12:43 PM 7/23/2019    11:37 AM   PAP / HPV   PAP  Negative for Intraepithelial Lesion or Malignancy (NILM)      PAP (Historical)    NIL    HPV 16 DNA Negative Negative  Negative     HPV 18 DNA Negative Negative  Negative     Other HR HPV Negative Negative  Negative       ASCVD Risk   The 10-year ASCVD risk score (Keith DUNLAP, et al., 2019) is: 2.5%    Values used to calculate the score:      Age: 58 years      Sex: Female      Is Non- : No      Diabetic: No      Tobacco smoker: No      Systolic Blood Pressure: 122 mmHg      Is  "BP treated: No      HDL Cholesterol: 94 mg/dL      Total Cholesterol: 252 mg/dL          Reviewed and updated as needed this visit by Provider                    Past Medical History:   Diagnosis Date    Abnormal Pap smear of cervix 08/16/2018    LSIL, +HR HPV, not 16/18: see problem list    Arthritis     Cervical high risk HPV (human papillomavirus) test positive 08/17/2017, 8/16/18    + HR HPV (not 16 or 18) result.     Herpes simplex without mention of complication     Unspecified contraceptive management      Past Surgical History:   Procedure Laterality Date    COLONOSCOPY      HC TOOTH EXTRACTION W/FORCEP      TONSILLECTOMY              Objective    Exam  /88 (BP Location: Right arm, Patient Position: Sitting, Cuff Size: Adult Regular)   Pulse 64   Temp 98.2  F (36.8  C) (Temporal)   Resp 15   Ht 1.63 m (5' 4.17\")   Wt 76 kg (167 lb 9.6 oz)   LMP 10/10/2016   SpO2 96%   BMI 28.61 kg/m     Estimated body mass index is 28.61 kg/m  as calculated from the following:    Height as of this encounter: 1.63 m (5' 4.17\").    Weight as of this encounter: 76 kg (167 lb 9.6 oz).    Physical Exam  GENERAL: alert and no distress  HEENT: Head: Normocephalic, atraumatic.  Eyes: Conjunctiva clear, non icteric. PERRLA.  Ears: External ears normal, cerumen impaction present.   Nose: No discharge.Mouth / Throat: Normal dentition.  No oral lesions. Pharynx non erythematous, tonsils without hypertrophy.  NECK: no adenopathy, no asymmetry, masses, or scars  RESP: lungs clear to auscultation - no rales, rhonchi or wheezes  CV: regular rate and rhythm, normal S1 S2, no S3 or S4, no murmur, click or rub, no peripheral edema  ABDOMEN: soft, nontender, no hepatosplenomegaly, no masses and bowel sounds normal  MS: no gross musculoskeletal defects noted, no edema         Signed Electronically by: Christal Keith MD     "

## 2024-09-30 NOTE — PROGRESS NOTES
Writer received referral to Cancer Risk Management/Genetic Counseling.    Referred for:   family history of cancers     Referred By    Provider Department Location Phone   House, Christal VELASQUEZ MD [facility] [phone]       Referral reviewed for appropriate plan, and sent to New Patient Scheduling ([phone]) for completion.    Radha Monge RN, BSN  Oncology New Patient Nurse Navigator   [facility]

## 2024-10-01 NOTE — RESULT ENCOUNTER NOTE
The results of your recent lipid (cholesterol) profile were abnormal.    Here are the results:  Lab Results       Component                Value               Date                       CHOL                     231                 09/30/2024                 CHOL                     241                 01/04/2019            Lab Results       Component                Value               Date                       HDL                      84                  09/30/2024                 HDL                      111                 01/04/2019            Lab Results       Component                Value               Date                       LDL                      111                 09/30/2024                 LDL                      115                 01/04/2019            Lab Results       Component                Value               Date                       TRIG                     180                 09/30/2024                 TRIG                     77                  01/04/2019            Lab Results       Component                Value               Date                       CHOLHDLRATIO             2.2                 08/11/2010              Desired or goal levels are:  CHOLESTEROL: Desirable is less than 200.   HDL (Good Cholesterol): Desirable is greater than 40 (for men) greater than 50 (for women).  LDL (Bad Cholesterol): Desirable is less than 130 (or less than 100 if you have heart disease or diabetes). Borderline 130-160.  TRIGLYCERIDES: Desirable is less than 150.  Borderline is 150-200.    For high triglycerides, reduce the intake of jam, jelly, honey, alcohol, and/or coffee creamers.    To help lower your LDL (bad cholesterol) you could start adding ground flax seed to your diet. The recommended dose is 2 heaping tablespoonfuls daily, you may want to start with 1 tablespoonful and increase to 2 heaping tablespoonfuls. You can mix or add ground flax seed to hot cereals, yogurt, applesauce,  cottage cheese or any sauce mixture that is your portion. Ground flax seed can be found in the baking aisle near the flour.      As you may know, an elevated cholesterol is one factor that increases your risk for heart disease and stroke. You can improve your cholesterol by controlling the amount and type of fat you eat and by increasing your daily activity level.    Here are some ways to improve your nutrition:  Eat less fat (especially butter, Crisco and other saturated fats)  Buy lean cuts of meat, reduce your portions of red meat or substitute poultry or fish  Use skim milk and low-fat dairy products  Eat no more than 4 egg yolks per week  Avoid fried or fast foods that are high in fat  Eat more fruits and vegetables      Also consider starting or increasing your aerobic activity. Aerobic activity is the best way to improve HDL (good) cholesterol. If this would be new to you, please talk with me first about what activities are safe for you.      Other lab results:    The testing of your blood sugar, kidney function, liver function and electrolytes was normal.       Please feel free to contact us with any questions or if you would like more information.      Christal Keith M.D.

## 2024-12-02 ENCOUNTER — PATIENT OUTREACH (OUTPATIENT)
Dept: CARE COORDINATION | Facility: CLINIC | Age: 59
End: 2024-12-02
Payer: COMMERCIAL

## 2024-12-03 ENCOUNTER — HOSPITAL ENCOUNTER (OUTPATIENT)
Dept: MAMMOGRAPHY | Facility: CLINIC | Age: 59
Discharge: HOME | End: 2024-12-03
Attending: FAMILY MEDICINE
Payer: COMMERCIAL

## 2024-12-03 DIAGNOSIS — Z12.31 SCREENING MAMMOGRAM, ENCOUNTER FOR: ICD-10-CM

## 2024-12-03 PROCEDURE — 77067 SCR MAMMO BI INCL CAD: CPT

## 2024-12-03 PROCEDURE — 77063 BREAST TOMOSYNTHESIS BI: CPT

## 2025-03-27 DIAGNOSIS — F40.298 ISOLATED OR SPECIFIC PHOBIA: ICD-10-CM

## 2025-03-31 RX ORDER — LORAZEPAM 0.5 MG/1
0.25-0.5 TABLET ORAL EVERY 8 HOURS PRN
Qty: 15 TABLET | Refills: 0 | Status: SHIPPED | OUTPATIENT
Start: 2025-03-31

## 2025-05-17 ENCOUNTER — OFFICE VISIT (OUTPATIENT)
Dept: URGENT CARE | Facility: URGENT CARE | Age: 60
End: 2025-05-17

## 2025-05-17 VITALS
DIASTOLIC BLOOD PRESSURE: 89 MMHG | HEIGHT: 65 IN | BODY MASS INDEX: 27.47 KG/M2 | OXYGEN SATURATION: 95 % | TEMPERATURE: 98.5 F | RESPIRATION RATE: 16 BRPM | WEIGHT: 164.9 LBS | SYSTOLIC BLOOD PRESSURE: 136 MMHG | HEART RATE: 76 BPM

## 2025-05-17 DIAGNOSIS — S09.90XA INJURY OF HEAD, INITIAL ENCOUNTER: Primary | ICD-10-CM

## 2025-05-17 PROCEDURE — 3075F SYST BP GE 130 - 139MM HG: CPT | Performed by: FAMILY MEDICINE

## 2025-05-17 PROCEDURE — 99214 OFFICE O/P EST MOD 30 MIN: CPT | Performed by: FAMILY MEDICINE

## 2025-05-17 PROCEDURE — 3079F DIAST BP 80-89 MM HG: CPT | Performed by: FAMILY MEDICINE
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
## 2025-05-17 NOTE — PATIENT INSTRUCTIONS
If you develop worsening headache, visual difficulties, vomiting, balance difficulties --then seek help right away      Cold compresses for the next day to help with mild swelling at the area affected      Stay hydrated drink 70 ounces of water/fluids a day

## 2025-08-13 ENCOUNTER — MYC MEDICAL ADVICE (OUTPATIENT)
Dept: FAMILY MEDICINE | Facility: CLINIC | Age: 60
End: 2025-08-13
Payer: COMMERCIAL

## 2025-08-27 ENCOUNTER — TRANSFERRED RECORDS (OUTPATIENT)
Dept: HEALTH INFORMATION MANAGEMENT | Facility: CLINIC | Age: 60
End: 2025-08-27
Payer: COMMERCIAL